# Patient Record
Sex: MALE | Race: WHITE | Employment: OTHER | URBAN - NONMETROPOLITAN AREA
[De-identification: names, ages, dates, MRNs, and addresses within clinical notes are randomized per-mention and may not be internally consistent; named-entity substitution may affect disease eponyms.]

---

## 2017-11-28 ENCOUNTER — HOSPITAL ENCOUNTER (OUTPATIENT)
Dept: NON INVASIVE DIAGNOSTICS | Age: 72
Discharge: HOME | End: 2017-11-28
Payer: OTHER GOVERNMENT

## 2017-11-28 LAB
LV EF: 60 %
LVEF MODALITY: NORMAL

## 2017-11-28 PROCEDURE — A9500 TC99M SESTAMIBI: HCPCS | Performed by: FAMILY MEDICINE

## 2017-11-28 PROCEDURE — 93017 CV STRESS TEST TRACING ONLY: CPT

## 2017-11-28 PROCEDURE — 3430000000 HC RX DIAGNOSTIC RADIOPHARMACEUTICAL: Performed by: INTERNAL MEDICINE

## 2017-11-28 PROCEDURE — A9500 TC99M SESTAMIBI: HCPCS | Performed by: INTERNAL MEDICINE

## 2017-11-28 PROCEDURE — 3430000000 HC RX DIAGNOSTIC RADIOPHARMACEUTICAL: Performed by: FAMILY MEDICINE

## 2017-11-28 PROCEDURE — 6360000002 HC RX W HCPCS: Performed by: FAMILY MEDICINE

## 2017-11-28 PROCEDURE — 93306 TTE W/DOPPLER COMPLETE: CPT

## 2017-11-28 PROCEDURE — 78452 HT MUSCLE IMAGE SPECT MULT: CPT

## 2017-11-28 RX ADMIN — Medication 33.1 MILLICURIE: at 09:35

## 2017-11-28 RX ADMIN — REGADENOSON 0.4 MG: 0.08 INJECTION, SOLUTION INTRAVENOUS at 09:35

## 2017-11-29 ENCOUNTER — HOSPITAL ENCOUNTER (OUTPATIENT)
Dept: NON INVASIVE DIAGNOSTICS | Age: 72
Discharge: HOME | End: 2017-11-29
Payer: OTHER GOVERNMENT

## 2017-11-29 PROCEDURE — A9500 TC99M SESTAMIBI: HCPCS | Performed by: INTERNAL MEDICINE

## 2017-11-29 PROCEDURE — 3430000000 HC RX DIAGNOSTIC RADIOPHARMACEUTICAL: Performed by: INTERNAL MEDICINE

## 2017-11-29 RX ADMIN — Medication 30 MILLICURIE: at 12:25

## 2018-06-11 ENCOUNTER — APPOINTMENT (OUTPATIENT)
Dept: GENERAL RADIOLOGY | Age: 73
End: 2018-06-11
Payer: OTHER GOVERNMENT

## 2018-06-11 ENCOUNTER — HOSPITAL ENCOUNTER (EMERGENCY)
Age: 73
Discharge: HOME | End: 2018-06-11
Attending: EMERGENCY MEDICINE
Payer: OTHER GOVERNMENT

## 2018-06-11 VITALS — TEMPERATURE: 97.9 F | HEART RATE: 55 BPM | RESPIRATION RATE: 16 BRPM | OXYGEN SATURATION: 95 %

## 2018-06-11 DIAGNOSIS — S52.501A CLOSED FRACTURE OF DISTAL ENDS OF RIGHT RADIUS AND ULNA, INITIAL ENCOUNTER: Primary | ICD-10-CM

## 2018-06-11 DIAGNOSIS — S52.601A CLOSED FRACTURE OF DISTAL ENDS OF RIGHT RADIUS AND ULNA, INITIAL ENCOUNTER: Primary | ICD-10-CM

## 2018-06-11 LAB
ABSOLUTE EOS #: 0.22 K/UL (ref 0–0.44)
ABSOLUTE IMMATURE GRANULOCYTE: 0.06 K/UL (ref 0–0.3)
ABSOLUTE LYMPH #: 0.9 K/UL (ref 1.1–3.7)
ABSOLUTE MONO #: 0.67 K/UL (ref 0.1–1.2)
ALBUMIN SERPL-MCNC: 3.7 G/DL (ref 3.5–5.2)
ALBUMIN/GLOBULIN RATIO: 1.2 (ref 1–2.5)
ALP BLD-CCNC: 85 U/L (ref 40–129)
ALT SERPL-CCNC: 13 U/L (ref 5–41)
ANION GAP SERPL CALCULATED.3IONS-SCNC: 13 MMOL/L (ref 9–17)
AST SERPL-CCNC: 15 U/L
BASOPHILS # BLD: 1 % (ref 0–2)
BASOPHILS ABSOLUTE: 0.06 K/UL (ref 0–0.2)
BILIRUB SERPL-MCNC: 0.35 MG/DL (ref 0.3–1.2)
BUN BLDV-MCNC: 11 MG/DL (ref 8–23)
BUN/CREAT BLD: 15 (ref 9–20)
CALCIUM SERPL-MCNC: 9.1 MG/DL (ref 8.6–10.4)
CHLORIDE BLD-SCNC: 101 MMOL/L (ref 98–107)
CO2: 26 MMOL/L (ref 20–31)
CREAT SERPL-MCNC: 0.74 MG/DL (ref 0.7–1.2)
DIFFERENTIAL TYPE: ABNORMAL
EKG ATRIAL RATE: 54 BPM
EKG P AXIS: 44 DEGREES
EKG P-R INTERVAL: 188 MS
EKG Q-T INTERVAL: 452 MS
EKG QRS DURATION: 108 MS
EKG QTC CALCULATION (BAZETT): 428 MS
EKG R AXIS: 7 DEGREES
EKG T AXIS: 19 DEGREES
EKG VENTRICULAR RATE: 54 BPM
EOSINOPHILS RELATIVE PERCENT: 3 % (ref 1–4)
GFR AFRICAN AMERICAN: >60 ML/MIN
GFR NON-AFRICAN AMERICAN: >60 ML/MIN
GFR SERPL CREATININE-BSD FRML MDRD: ABNORMAL ML/MIN/{1.73_M2}
GFR SERPL CREATININE-BSD FRML MDRD: ABNORMAL ML/MIN/{1.73_M2}
GLUCOSE BLD-MCNC: 117 MG/DL (ref 70–99)
HCT VFR BLD CALC: 39.7 % (ref 40.7–50.3)
HEMOGLOBIN: 13.6 G/DL (ref 13–17)
IMMATURE GRANULOCYTES: 1 %
LYMPHOCYTES # BLD: 12 % (ref 24–43)
MCH RBC QN AUTO: 31.1 PG (ref 25.2–33.5)
MCHC RBC AUTO-ENTMCNC: 34.3 G/DL (ref 28.4–34.8)
MCV RBC AUTO: 90.8 FL (ref 82.6–102.9)
MONOCYTES # BLD: 9 % (ref 3–12)
NRBC AUTOMATED: 0 PER 100 WBC
PDW BLD-RTO: 13.7 % (ref 11.8–14.4)
PLATELET # BLD: 206 K/UL (ref 138–453)
PLATELET ESTIMATE: ABNORMAL
PMV BLD AUTO: 10.2 FL (ref 8.1–13.5)
POTASSIUM SERPL-SCNC: 3.8 MMOL/L (ref 3.7–5.3)
RBC # BLD: 4.37 M/UL (ref 4.21–5.77)
RBC # BLD: ABNORMAL 10*6/UL
SEG NEUTROPHILS: 74 % (ref 36–65)
SEGMENTED NEUTROPHILS ABSOLUTE COUNT: 5.37 K/UL (ref 1.5–8.1)
SODIUM BLD-SCNC: 140 MMOL/L (ref 135–144)
TOTAL PROTEIN: 6.9 G/DL (ref 6.4–8.3)
WBC # BLD: 7.3 K/UL (ref 3.5–11.3)
WBC # BLD: ABNORMAL 10*3/UL

## 2018-06-11 PROCEDURE — 6370000000 HC RX 637 (ALT 250 FOR IP): Performed by: EMERGENCY MEDICINE

## 2018-06-11 PROCEDURE — 85025 COMPLETE CBC W/AUTO DIFF WBC: CPT

## 2018-06-11 PROCEDURE — 71046 X-RAY EXAM CHEST 2 VIEWS: CPT

## 2018-06-11 PROCEDURE — 36415 COLL VENOUS BLD VENIPUNCTURE: CPT

## 2018-06-11 PROCEDURE — 80053 COMPREHEN METABOLIC PANEL: CPT

## 2018-06-11 PROCEDURE — 93005 ELECTROCARDIOGRAM TRACING: CPT

## 2018-06-11 PROCEDURE — 6360000002 HC RX W HCPCS: Performed by: EMERGENCY MEDICINE

## 2018-06-11 PROCEDURE — 73110 X-RAY EXAM OF WRIST: CPT

## 2018-06-11 PROCEDURE — 99284 EMERGENCY DEPT VISIT MOD MDM: CPT

## 2018-06-11 RX ORDER — ATORVASTATIN CALCIUM 10 MG/1
10 TABLET, FILM COATED ORAL DAILY
COMMUNITY

## 2018-06-11 RX ORDER — OXYCODONE HYDROCHLORIDE AND ACETAMINOPHEN 5; 325 MG/1; MG/1
1 TABLET ORAL ONCE
Status: COMPLETED | OUTPATIENT
Start: 2018-06-11 | End: 2018-06-11

## 2018-06-11 RX ORDER — HYDROCODONE BITARTRATE AND ACETAMINOPHEN 5; 325 MG/1; MG/1
1 TABLET ORAL EVERY 6 HOURS PRN
Qty: 10 TABLET | Refills: 0 | Status: ON HOLD | OUTPATIENT
Start: 2018-06-11 | End: 2018-06-12

## 2018-06-11 RX ORDER — ONDANSETRON 4 MG/1
4 TABLET, ORALLY DISINTEGRATING ORAL ONCE
Status: COMPLETED | OUTPATIENT
Start: 2018-06-11 | End: 2018-06-11

## 2018-06-11 RX ORDER — IBUPROFEN 200 MG
200 TABLET ORAL EVERY 6 HOURS PRN
COMMUNITY

## 2018-06-11 RX ADMIN — OXYCODONE HYDROCHLORIDE AND ACETAMINOPHEN 1 TABLET: 5; 325 TABLET ORAL at 09:19

## 2018-06-11 RX ADMIN — ONDANSETRON 4 MG: 4 TABLET, ORALLY DISINTEGRATING ORAL at 09:19

## 2018-06-11 ASSESSMENT — ENCOUNTER SYMPTOMS
ABDOMINAL PAIN: 0
COUGH: 0
SINUS PRESSURE: 0
SINUS PAIN: 0
DIARRHEA: 0
EYE PAIN: 0
SORE THROAT: 0
SHORTNESS OF BREATH: 0
NAUSEA: 0
VOMITING: 0
EYE DISCHARGE: 0

## 2018-06-11 ASSESSMENT — PAIN DESCRIPTION - ORIENTATION: ORIENTATION: RIGHT

## 2018-06-11 ASSESSMENT — PAIN DESCRIPTION - FREQUENCY: FREQUENCY: CONTINUOUS

## 2018-06-11 ASSESSMENT — PAIN SCALES - GENERAL
PAINLEVEL_OUTOF10: 5
PAINLEVEL_OUTOF10: 9
PAINLEVEL_OUTOF10: 9

## 2018-06-11 ASSESSMENT — PAIN DESCRIPTION - PAIN TYPE: TYPE: ACUTE PAIN

## 2018-06-11 ASSESSMENT — PAIN DESCRIPTION - LOCATION: LOCATION: WRIST

## 2018-06-11 ASSESSMENT — PAIN DESCRIPTION - DESCRIPTORS: DESCRIPTORS: ACHING;DISCOMFORT;POUNDING

## 2018-06-12 ENCOUNTER — HOSPITAL ENCOUNTER (OUTPATIENT)
Age: 73
Setting detail: OUTPATIENT SURGERY
Discharge: HOME | End: 2018-06-12
Attending: ORTHOPAEDIC SURGERY | Admitting: ORTHOPAEDIC SURGERY
Payer: OTHER GOVERNMENT

## 2018-06-12 ENCOUNTER — ANESTHESIA (OUTPATIENT)
Dept: OPERATING ROOM | Age: 73
End: 2018-06-12
Payer: OTHER GOVERNMENT

## 2018-06-12 ENCOUNTER — APPOINTMENT (OUTPATIENT)
Dept: GENERAL RADIOLOGY | Age: 73
End: 2018-06-12
Attending: ORTHOPAEDIC SURGERY
Payer: OTHER GOVERNMENT

## 2018-06-12 ENCOUNTER — ANESTHESIA EVENT (OUTPATIENT)
Dept: OPERATING ROOM | Age: 73
End: 2018-06-12
Payer: OTHER GOVERNMENT

## 2018-06-12 VITALS
RESPIRATION RATE: 2 BRPM | OXYGEN SATURATION: 96 % | TEMPERATURE: 98.6 F | SYSTOLIC BLOOD PRESSURE: 126 MMHG | DIASTOLIC BLOOD PRESSURE: 69 MMHG

## 2018-06-12 VITALS
SYSTOLIC BLOOD PRESSURE: 137 MMHG | BODY MASS INDEX: 32.58 KG/M2 | TEMPERATURE: 97.4 F | HEART RATE: 60 BPM | HEIGHT: 69 IN | WEIGHT: 220 LBS | RESPIRATION RATE: 16 BRPM | DIASTOLIC BLOOD PRESSURE: 77 MMHG | OXYGEN SATURATION: 98 %

## 2018-06-12 DIAGNOSIS — S52.601A CLOSED FRACTURE OF DISTAL ENDS OF RIGHT RADIUS AND ULNA, INITIAL ENCOUNTER: ICD-10-CM

## 2018-06-12 DIAGNOSIS — G89.18 POSTOPERATIVE PAIN: Primary | ICD-10-CM

## 2018-06-12 DIAGNOSIS — S52.501A CLOSED FRACTURE OF DISTAL ENDS OF RIGHT RADIUS AND ULNA, INITIAL ENCOUNTER: ICD-10-CM

## 2018-06-12 PROCEDURE — 3600000004 HC SURGERY LEVEL 4 BASE: Performed by: ORTHOPAEDIC SURGERY

## 2018-06-12 PROCEDURE — 7100000011 HC PHASE II RECOVERY - ADDTL 15 MIN: Performed by: ORTHOPAEDIC SURGERY

## 2018-06-12 PROCEDURE — 6370000000 HC RX 637 (ALT 250 FOR IP): Performed by: ORTHOPAEDIC SURGERY

## 2018-06-12 PROCEDURE — 2580000003 HC RX 258: Performed by: ORTHOPAEDIC SURGERY

## 2018-06-12 PROCEDURE — 3700000000 HC ANESTHESIA ATTENDED CARE: Performed by: ORTHOPAEDIC SURGERY

## 2018-06-12 PROCEDURE — 6360000002 HC RX W HCPCS: Performed by: NURSE ANESTHETIST, CERTIFIED REGISTERED

## 2018-06-12 PROCEDURE — 7100000000 HC PACU RECOVERY - FIRST 15 MIN: Performed by: ORTHOPAEDIC SURGERY

## 2018-06-12 PROCEDURE — 6360000002 HC RX W HCPCS: Performed by: ORTHOPAEDIC SURGERY

## 2018-06-12 PROCEDURE — 3700000001 HC ADD 15 MINUTES (ANESTHESIA): Performed by: ORTHOPAEDIC SURGERY

## 2018-06-12 PROCEDURE — 7100000010 HC PHASE II RECOVERY - FIRST 15 MIN: Performed by: ORTHOPAEDIC SURGERY

## 2018-06-12 PROCEDURE — 2500000003 HC RX 250 WO HCPCS: Performed by: NURSE ANESTHETIST, CERTIFIED REGISTERED

## 2018-06-12 PROCEDURE — C1713 ANCHOR/SCREW BN/BN,TIS/BN: HCPCS | Performed by: ORTHOPAEDIC SURGERY

## 2018-06-12 PROCEDURE — 7100000001 HC PACU RECOVERY - ADDTL 15 MIN: Performed by: ORTHOPAEDIC SURGERY

## 2018-06-12 PROCEDURE — 2720000010 HC SURG SUPPLY STERILE: Performed by: ORTHOPAEDIC SURGERY

## 2018-06-12 PROCEDURE — 3600000014 HC SURGERY LEVEL 4 ADDTL 15MIN: Performed by: ORTHOPAEDIC SURGERY

## 2018-06-12 PROCEDURE — 73100 X-RAY EXAM OF WRIST: CPT

## 2018-06-12 DEVICE — PEG BONE FIX L22MM DIA2.2MM DSTL VOLAR RAD SMOOTH LCK FOR: Type: IMPLANTABLE DEVICE | Site: WRIST | Status: FUNCTIONAL

## 2018-06-12 DEVICE — SCREW BNE L18MM DIA2.7MM DST VOLAR RAD NONLOCKING FULL THRD: Type: IMPLANTABLE DEVICE | Site: WRIST | Status: FUNCTIONAL

## 2018-06-12 DEVICE — K WIRE FIX DIA1.6MM S STL FOR DST VOLAR PLATING SYS: Type: IMPLANTABLE DEVICE | Site: WRIST | Status: FUNCTIONAL

## 2018-06-12 DEVICE — SCREW BNE L20MM DIA2.7MM CORT DST RAD NONLOCKING FULL THRD: Type: IMPLANTABLE DEVICE | Site: WRIST | Status: FUNCTIONAL

## 2018-06-12 DEVICE — PEG BONE FIX L24MM DIA2.2MM DSTL VOLAR RAD SMOOTH LCK FOR: Type: IMPLANTABLE DEVICE | Site: WRIST | Status: FUNCTIONAL

## 2018-06-12 RX ORDER — HYDROCODONE BITARTRATE AND ACETAMINOPHEN 5; 325 MG/1; MG/1
1 TABLET ORAL EVERY 4 HOURS PRN
Qty: 40 TABLET | Refills: 0 | Status: SHIPPED | OUTPATIENT
Start: 2018-06-12 | End: 2018-06-19

## 2018-06-12 RX ORDER — ONDANSETRON 2 MG/ML
INJECTION INTRAMUSCULAR; INTRAVENOUS PRN
Status: DISCONTINUED | OUTPATIENT
Start: 2018-06-12 | End: 2018-06-12 | Stop reason: SDUPTHER

## 2018-06-12 RX ORDER — MEPERIDINE HYDROCHLORIDE 50 MG/ML
12.5 INJECTION INTRAMUSCULAR; INTRAVENOUS; SUBCUTANEOUS EVERY 5 MIN PRN
Status: DISCONTINUED | OUTPATIENT
Start: 2018-06-12 | End: 2018-06-12 | Stop reason: HOSPADM

## 2018-06-12 RX ORDER — DEXAMETHASONE SODIUM PHOSPHATE 4 MG/ML
INJECTION, SOLUTION INTRA-ARTICULAR; INTRALESIONAL; INTRAMUSCULAR; INTRAVENOUS; SOFT TISSUE PRN
Status: DISCONTINUED | OUTPATIENT
Start: 2018-06-12 | End: 2018-06-12 | Stop reason: SDUPTHER

## 2018-06-12 RX ORDER — PROPOFOL 10 MG/ML
INJECTION, EMULSION INTRAVENOUS CONTINUOUS PRN
Status: DISCONTINUED | OUTPATIENT
Start: 2018-06-12 | End: 2018-06-12 | Stop reason: SDUPTHER

## 2018-06-12 RX ORDER — ACETAMINOPHEN 500 MG
1500 TABLET ORAL EVERY 6 HOURS PRN
COMMUNITY

## 2018-06-12 RX ORDER — PROPOFOL 10 MG/ML
INJECTION, EMULSION INTRAVENOUS PRN
Status: DISCONTINUED | OUTPATIENT
Start: 2018-06-12 | End: 2018-06-12 | Stop reason: SDUPTHER

## 2018-06-12 RX ORDER — DIMENHYDRINATE 50 MG/1
50 TABLET ORAL ONCE
Status: COMPLETED | OUTPATIENT
Start: 2018-06-12 | End: 2018-06-12

## 2018-06-12 RX ORDER — FENTANYL CITRATE 50 UG/ML
25 INJECTION, SOLUTION INTRAMUSCULAR; INTRAVENOUS EVERY 5 MIN PRN
Status: DISCONTINUED | OUTPATIENT
Start: 2018-06-12 | End: 2018-06-12 | Stop reason: HOSPADM

## 2018-06-12 RX ORDER — LABETALOL HYDROCHLORIDE 5 MG/ML
5 INJECTION, SOLUTION INTRAVENOUS EVERY 10 MIN PRN
Status: DISCONTINUED | OUTPATIENT
Start: 2018-06-12 | End: 2018-06-12 | Stop reason: HOSPADM

## 2018-06-12 RX ORDER — OXYCODONE HYDROCHLORIDE 5 MG/1
5 TABLET ORAL
Status: COMPLETED | OUTPATIENT
Start: 2018-06-12 | End: 2018-06-12

## 2018-06-12 RX ORDER — ACETAMINOPHEN 325 MG/1
650 TABLET ORAL EVERY 4 HOURS PRN
Status: DISCONTINUED | OUTPATIENT
Start: 2018-06-12 | End: 2018-06-12 | Stop reason: HOSPADM

## 2018-06-12 RX ORDER — LIDOCAINE HYDROCHLORIDE 20 MG/ML
INJECTION, SOLUTION INFILTRATION; PERINEURAL PRN
Status: DISCONTINUED | OUTPATIENT
Start: 2018-06-12 | End: 2018-06-12 | Stop reason: SDUPTHER

## 2018-06-12 RX ORDER — FENTANYL CITRATE 50 UG/ML
50 INJECTION, SOLUTION INTRAMUSCULAR; INTRAVENOUS EVERY 5 MIN PRN
Status: DISCONTINUED | OUTPATIENT
Start: 2018-06-12 | End: 2018-06-12 | Stop reason: HOSPADM

## 2018-06-12 RX ORDER — FENTANYL CITRATE 50 UG/ML
INJECTION, SOLUTION INTRAMUSCULAR; INTRAVENOUS PRN
Status: DISCONTINUED | OUTPATIENT
Start: 2018-06-12 | End: 2018-06-12 | Stop reason: SDUPTHER

## 2018-06-12 RX ORDER — PROMETHAZINE HYDROCHLORIDE 25 MG/ML
6.25 INJECTION, SOLUTION INTRAMUSCULAR; INTRAVENOUS
Status: DISCONTINUED | OUTPATIENT
Start: 2018-06-12 | End: 2018-06-12 | Stop reason: HOSPADM

## 2018-06-12 RX ORDER — DIMENHYDRINATE 50 MG/1
50 TABLET ORAL NIGHTLY PRN
Status: DISCONTINUED | OUTPATIENT
Start: 2018-06-12 | End: 2018-06-12

## 2018-06-12 RX ORDER — EPHEDRINE SULFATE 50 MG/ML
INJECTION, SOLUTION INTRAVENOUS PRN
Status: DISCONTINUED | OUTPATIENT
Start: 2018-06-12 | End: 2018-06-12 | Stop reason: SDUPTHER

## 2018-06-12 RX ORDER — SODIUM CHLORIDE, SODIUM LACTATE, POTASSIUM CHLORIDE, CALCIUM CHLORIDE 600; 310; 30; 20 MG/100ML; MG/100ML; MG/100ML; MG/100ML
INJECTION, SOLUTION INTRAVENOUS CONTINUOUS
Status: DISCONTINUED | OUTPATIENT
Start: 2018-06-12 | End: 2018-06-12 | Stop reason: HOSPADM

## 2018-06-12 RX ORDER — ONDANSETRON 2 MG/ML
4 INJECTION INTRAMUSCULAR; INTRAVENOUS
Status: DISCONTINUED | OUTPATIENT
Start: 2018-06-12 | End: 2018-06-12 | Stop reason: HOSPADM

## 2018-06-12 RX ADMIN — FENTANYL CITRATE 50 MCG: 50 INJECTION INTRAMUSCULAR; INTRAVENOUS at 14:52

## 2018-06-12 RX ADMIN — FENTANYL CITRATE 50 MCG: 50 INJECTION INTRAMUSCULAR; INTRAVENOUS at 14:47

## 2018-06-12 RX ADMIN — OXYCODONE HYDROCHLORIDE 5 MG: 5 TABLET ORAL at 17:17

## 2018-06-12 RX ADMIN — EPHEDRINE SULFATE 15 MG: 50 INJECTION INTRAMUSCULAR; INTRAVENOUS; SUBCUTANEOUS at 15:56

## 2018-06-12 RX ADMIN — PROPOFOL 200 MG: 10 INJECTION, EMULSION INTRAVENOUS at 14:52

## 2018-06-12 RX ADMIN — LIDOCAINE HYDROCHLORIDE 100 MG: 20 INJECTION, SOLUTION INFILTRATION; PERINEURAL at 14:52

## 2018-06-12 RX ADMIN — FENTANYL CITRATE 25 MCG: 50 INJECTION INTRAMUSCULAR; INTRAVENOUS at 16:48

## 2018-06-12 RX ADMIN — DEXAMETHASONE SODIUM PHOSPHATE 8 MG: 4 INJECTION, SOLUTION INTRAMUSCULAR; INTRAVENOUS at 14:56

## 2018-06-12 RX ADMIN — SODIUM CHLORIDE, POTASSIUM CHLORIDE, SODIUM LACTATE AND CALCIUM CHLORIDE: 600; 310; 30; 20 INJECTION, SOLUTION INTRAVENOUS at 14:24

## 2018-06-12 RX ADMIN — ONDANSETRON 4 MG: 2 INJECTION INTRAMUSCULAR; INTRAVENOUS at 15:20

## 2018-06-12 RX ADMIN — SODIUM CHLORIDE, POTASSIUM CHLORIDE, SODIUM LACTATE AND CALCIUM CHLORIDE: 600; 310; 30; 20 INJECTION, SOLUTION INTRAVENOUS at 15:56

## 2018-06-12 RX ADMIN — PROPOFOL 75 MCG/KG/MIN: 10 INJECTION, EMULSION INTRAVENOUS at 14:52

## 2018-06-12 RX ADMIN — DIMENHYDRINATE 50 MG: 50 TABLET ORAL at 14:31

## 2018-06-12 RX ADMIN — ONDANSETRON 4 MG: 2 INJECTION INTRAMUSCULAR; INTRAVENOUS at 16:00

## 2018-06-12 RX ADMIN — FENTANYL CITRATE 25 MCG: 50 INJECTION INTRAMUSCULAR; INTRAVENOUS at 16:38

## 2018-06-12 RX ADMIN — Medication 2 G: at 14:43

## 2018-06-12 ASSESSMENT — PAIN DESCRIPTION - PAIN TYPE
TYPE: ACUTE PAIN;SURGICAL PAIN

## 2018-06-12 ASSESSMENT — PAIN DESCRIPTION - LOCATION
LOCATION: WRIST

## 2018-06-12 ASSESSMENT — PAIN SCALES - GENERAL
PAINLEVEL_OUTOF10: 6
PAINLEVEL_OUTOF10: 6
PAINLEVEL_OUTOF10: 8
PAINLEVEL_OUTOF10: 8
PAINLEVEL_OUTOF10: 7
PAINLEVEL_OUTOF10: 8
PAINLEVEL_OUTOF10: 7
PAINLEVEL_OUTOF10: 7
PAINLEVEL_OUTOF10: 8
PAINLEVEL_OUTOF10: 7
PAINLEVEL_OUTOF10: 8

## 2018-06-12 ASSESSMENT — PULMONARY FUNCTION TESTS
PIF_VALUE: 0
PIF_VALUE: 1
PIF_VALUE: 12
PIF_VALUE: 16
PIF_VALUE: 4
PIF_VALUE: 14
PIF_VALUE: 14
PIF_VALUE: 18
PIF_VALUE: 2
PIF_VALUE: 3
PIF_VALUE: 12
PIF_VALUE: 16
PIF_VALUE: 12
PIF_VALUE: 18
PIF_VALUE: 12
PIF_VALUE: 2
PIF_VALUE: 18
PIF_VALUE: 0
PIF_VALUE: 18
PIF_VALUE: 1
PIF_VALUE: 16
PIF_VALUE: 17
PIF_VALUE: 18
PIF_VALUE: 12
PIF_VALUE: 2
PIF_VALUE: 3
PIF_VALUE: 2
PIF_VALUE: 17
PIF_VALUE: 17
PIF_VALUE: 12
PIF_VALUE: 18
PIF_VALUE: 14
PIF_VALUE: 11
PIF_VALUE: 3
PIF_VALUE: 0
PIF_VALUE: 18
PIF_VALUE: 0
PIF_VALUE: 18
PIF_VALUE: 12
PIF_VALUE: 15
PIF_VALUE: 17
PIF_VALUE: 18
PIF_VALUE: 2
PIF_VALUE: 12
PIF_VALUE: 18
PIF_VALUE: 18
PIF_VALUE: 14
PIF_VALUE: 0
PIF_VALUE: 18
PIF_VALUE: 16
PIF_VALUE: 0
PIF_VALUE: 14
PIF_VALUE: 1
PIF_VALUE: 18
PIF_VALUE: 18
PIF_VALUE: 12
PIF_VALUE: 18
PIF_VALUE: 12
PIF_VALUE: 0
PIF_VALUE: 13
PIF_VALUE: 14
PIF_VALUE: 16
PIF_VALUE: 18
PIF_VALUE: 17
PIF_VALUE: 4
PIF_VALUE: 12
PIF_VALUE: 18
PIF_VALUE: 12
PIF_VALUE: 18
PIF_VALUE: 18
PIF_VALUE: 1
PIF_VALUE: 10
PIF_VALUE: 14
PIF_VALUE: 2
PIF_VALUE: 10
PIF_VALUE: 18
PIF_VALUE: 12
PIF_VALUE: 14
PIF_VALUE: 18
PIF_VALUE: 16
PIF_VALUE: 12
PIF_VALUE: 2
PIF_VALUE: 0
PIF_VALUE: 18
PIF_VALUE: 4
PIF_VALUE: 18
PIF_VALUE: 12
PIF_VALUE: 16
PIF_VALUE: 18
PIF_VALUE: 17
PIF_VALUE: 2
PIF_VALUE: 27
PIF_VALUE: 1

## 2018-06-12 ASSESSMENT — PAIN DESCRIPTION - ORIENTATION
ORIENTATION: RIGHT

## 2018-06-12 ASSESSMENT — PAIN - FUNCTIONAL ASSESSMENT: PAIN_FUNCTIONAL_ASSESSMENT: 0-10

## 2020-09-08 ENCOUNTER — HOSPITAL ENCOUNTER (OUTPATIENT)
Dept: GENERAL RADIOLOGY | Age: 75
Discharge: HOME | End: 2020-09-10
Payer: OTHER GOVERNMENT

## 2020-09-08 PROCEDURE — 73630 X-RAY EXAM OF FOOT: CPT

## 2021-12-09 ENCOUNTER — TELEPHONE (OUTPATIENT)
Dept: SURGERY | Age: 76
End: 2021-12-09

## 2021-12-09 NOTE — TELEPHONE ENCOUNTER
Elton Eber was scheduled for a direct colonoscopy with Dr. June Jalloh. He stated that the [place] sent him a prep to use (Movi prep) and he was informed that it was ok to use that prep. All questions were answered and any other instructions were mailed to him.

## 2022-02-18 DIAGNOSIS — Z01.818 PRE-OP TESTING: Primary | ICD-10-CM

## 2022-02-18 RX ORDER — DICLOFENAC POTASSIUM 50 MG/1
50 TABLET, FILM COATED ORAL DAILY
COMMUNITY

## 2022-02-18 NOTE — PROGRESS NOTES
Patient states they received their colon prep instructions and home medications that are to be taken on the day of their procedure with a small sip of water only, from the physician's office. Caitlyn Campbell at Dr. Russell Chairez office notified pt will need an EKG done pre-op.

## 2022-02-24 ENCOUNTER — HOSPITAL ENCOUNTER (OUTPATIENT)
Dept: PREADMISSION TESTING | Age: 77
Setting detail: SPECIMEN
Discharge: HOME | End: 2022-02-28
Payer: OTHER GOVERNMENT

## 2022-02-24 ENCOUNTER — HOSPITAL ENCOUNTER (OUTPATIENT)
Age: 77
Discharge: HOME | End: 2022-02-24

## 2022-02-24 DIAGNOSIS — Z01.818 PRE-OP TESTING: ICD-10-CM

## 2022-02-24 DIAGNOSIS — Z01.818 PREOP TESTING: Primary | ICD-10-CM

## 2022-02-24 LAB
EKG ATRIAL RATE: 64 BPM
EKG P AXIS: 46 DEGREES
EKG P-R INTERVAL: 182 MS
EKG Q-T INTERVAL: 440 MS
EKG QRS DURATION: 122 MS
EKG QTC CALCULATION (BAZETT): 453 MS
EKG R AXIS: 19 DEGREES
EKG T AXIS: 23 DEGREES
EKG VENTRICULAR RATE: 64 BPM

## 2022-02-24 PROCEDURE — C9803 HOPD COVID-19 SPEC COLLECT: HCPCS

## 2022-02-24 PROCEDURE — U0005 INFEC AGEN DETEC AMPLI PROBE: HCPCS

## 2022-02-24 PROCEDURE — U0003 INFECTIOUS AGENT DETECTION BY NUCLEIC ACID (DNA OR RNA); SEVERE ACUTE RESPIRATORY SYNDROME CORONAVIRUS 2 (SARS-COV-2) (CORONAVIRUS DISEASE [COVID-19]), AMPLIFIED PROBE TECHNIQUE, MAKING USE OF HIGH THROUGHPUT TECHNOLOGIES AS DESCRIBED BY CMS-2020-01-R: HCPCS

## 2022-02-25 LAB
SARS-COV-2: NORMAL
SARS-COV-2: NOT DETECTED
SOURCE: NORMAL

## 2022-03-02 ENCOUNTER — ANESTHESIA EVENT (OUTPATIENT)
Dept: OPERATING ROOM | Age: 77
End: 2022-03-02
Payer: OTHER GOVERNMENT

## 2022-03-03 ENCOUNTER — HOSPITAL ENCOUNTER (OUTPATIENT)
Age: 77
Setting detail: OUTPATIENT SURGERY
Discharge: HOME | End: 2022-03-03
Attending: SURGERY | Admitting: SURGERY
Payer: OTHER GOVERNMENT

## 2022-03-03 ENCOUNTER — ANESTHESIA (OUTPATIENT)
Dept: OPERATING ROOM | Age: 77
End: 2022-03-03
Payer: OTHER GOVERNMENT

## 2022-03-03 VITALS
BODY MASS INDEX: 27.06 KG/M2 | WEIGHT: 189 LBS | SYSTOLIC BLOOD PRESSURE: 167 MMHG | HEART RATE: 57 BPM | RESPIRATION RATE: 16 BRPM | OXYGEN SATURATION: 99 % | HEIGHT: 70 IN | TEMPERATURE: 98.8 F | DIASTOLIC BLOOD PRESSURE: 86 MMHG

## 2022-03-03 VITALS
OXYGEN SATURATION: 98 % | DIASTOLIC BLOOD PRESSURE: 62 MMHG | TEMPERATURE: 97.7 F | SYSTOLIC BLOOD PRESSURE: 117 MMHG | RESPIRATION RATE: 18 BRPM

## 2022-03-03 PROBLEM — Z12.11 SCREEN FOR COLON CANCER: Status: ACTIVE | Noted: 2022-03-03

## 2022-03-03 PROCEDURE — 2500000003 HC RX 250 WO HCPCS: Performed by: NURSE ANESTHETIST, CERTIFIED REGISTERED

## 2022-03-03 PROCEDURE — 3700000001 HC ADD 15 MINUTES (ANESTHESIA): Performed by: SURGERY

## 2022-03-03 PROCEDURE — 3609010300 HC COLONOSCOPY W/BIOPSY SINGLE/MULTIPLE: Performed by: SURGERY

## 2022-03-03 PROCEDURE — 2709999900 HC NON-CHARGEABLE SUPPLY: Performed by: SURGERY

## 2022-03-03 PROCEDURE — 45380 COLONOSCOPY AND BIOPSY: CPT | Performed by: SURGERY

## 2022-03-03 PROCEDURE — 2580000003 HC RX 258: Performed by: NURSE ANESTHETIST, CERTIFIED REGISTERED

## 2022-03-03 PROCEDURE — 88305 TISSUE EXAM BY PATHOLOGIST: CPT

## 2022-03-03 PROCEDURE — 7100000010 HC PHASE II RECOVERY - FIRST 15 MIN: Performed by: SURGERY

## 2022-03-03 PROCEDURE — 7100000011 HC PHASE II RECOVERY - ADDTL 15 MIN: Performed by: SURGERY

## 2022-03-03 PROCEDURE — 6360000002 HC RX W HCPCS: Performed by: NURSE ANESTHETIST, CERTIFIED REGISTERED

## 2022-03-03 PROCEDURE — 3700000000 HC ANESTHESIA ATTENDED CARE: Performed by: SURGERY

## 2022-03-03 PROCEDURE — 45381 COLONOSCOPY SUBMUCOUS NJX: CPT | Performed by: SURGERY

## 2022-03-03 RX ORDER — SODIUM CHLORIDE, SODIUM LACTATE, POTASSIUM CHLORIDE, CALCIUM CHLORIDE 600; 310; 30; 20 MG/100ML; MG/100ML; MG/100ML; MG/100ML
INJECTION, SOLUTION INTRAVENOUS CONTINUOUS
Status: DISCONTINUED | OUTPATIENT
Start: 2022-03-03 | End: 2022-03-03 | Stop reason: HOSPADM

## 2022-03-03 RX ORDER — SODIUM CHLORIDE 0.9 % (FLUSH) 0.9 %
5-40 SYRINGE (ML) INJECTION PRN
Status: DISCONTINUED | OUTPATIENT
Start: 2022-03-03 | End: 2022-03-03 | Stop reason: HOSPADM

## 2022-03-03 RX ORDER — PROPOFOL 10 MG/ML
INJECTION, EMULSION INTRAVENOUS PRN
Status: DISCONTINUED | OUTPATIENT
Start: 2022-03-03 | End: 2022-03-03

## 2022-03-03 RX ORDER — PROPOFOL 10 MG/ML
INJECTION, EMULSION INTRAVENOUS CONTINUOUS PRN
Status: DISCONTINUED | OUTPATIENT
Start: 2022-03-03 | End: 2022-03-03 | Stop reason: SDUPTHER

## 2022-03-03 RX ORDER — SODIUM CHLORIDE 0.9 % (FLUSH) 0.9 %
5-40 SYRINGE (ML) INJECTION EVERY 12 HOURS SCHEDULED
Status: DISCONTINUED | OUTPATIENT
Start: 2022-03-03 | End: 2022-03-03 | Stop reason: HOSPADM

## 2022-03-03 RX ORDER — SODIUM CHLORIDE 9 MG/ML
25 INJECTION, SOLUTION INTRAVENOUS PRN
Status: DISCONTINUED | OUTPATIENT
Start: 2022-03-03 | End: 2022-03-03 | Stop reason: HOSPADM

## 2022-03-03 RX ADMIN — PROPOFOL 150 MCG/KG/MIN: 10 INJECTION, EMULSION INTRAVENOUS at 14:40

## 2022-03-03 RX ADMIN — SODIUM CHLORIDE, POTASSIUM CHLORIDE, SODIUM LACTATE AND CALCIUM CHLORIDE: 600; 310; 30; 20 INJECTION, SOLUTION INTRAVENOUS at 13:22

## 2022-03-03 RX ADMIN — LIDOCAINE HYDROCHLORIDE 4 ML: 20 INJECTION, SOLUTION EPIDURAL; INFILTRATION; INTRACAUDAL at 14:40

## 2022-03-03 ASSESSMENT — PAIN - FUNCTIONAL ASSESSMENT: PAIN_FUNCTIONAL_ASSESSMENT: 0-10

## 2022-03-03 NOTE — PROGRESS NOTES
Discharge Criteria    Inpatients must meet Criteria 1 through 7. All other patients are either YES or N/A. If a NO is chosen then Anesthesia or Surgeon must be notified. 1.  Minimum 30 minutes after last dose of sedative medication, minimum 120 minutes after last dose of reversal agent. Yes      2. Systolic BP stable within 20 mmHg for 30 minutes & systolic BP between 90 & 536 or within 10 mmHg of baseline. Yes, anesthesia aware of B/P and ok'd for discharge      3. Pulse between 60 and 100 or within 10 bpm of baseline. Yes, anesthesia aware of low heart rate and ok'd for discharge      4. Spontaneous respiratory rate >/= 10 per minute. Yes      5. SaO2 >/= 95 or  >/= baseline. Yes      6. Able to cough and swallow or return to baseline function. Yes      7. Alert and oriented or return to baseline mental status. Yes      8. Demonstrates controlled, coordinated movements, ambulates with steady gait, or return to baseline activity function. Yes      9. Minimal or no pain or nausea, or at a level tolerable and acceptable to patient. Yes      10. Takes and retains oral fluids as allowed. Yes      11. Procedural / perioperative site stable. Minimal or no bleeding. N/A          12. If GI endoscopy procedure, minimal or no abdominal distention or passing flatus. Yes      13. Written discharge instructions and emergency telephone number provided. Yes, Discharge instructions given to patient and wife with understanding voiced. No questions asked at this time      15. Accompanied by a responsible adult.     Yes

## 2022-03-03 NOTE — ANESTHESIA PRE PROCEDURE
Department of Anesthesiology  Preprocedure Note       Name:  Raymond Freeman   Age:  78 y.o.  :  1945                                          MRN:  [id number]         Date:  3/3/2022      Surgeon: Checo Rust):  Sharda Ocampo DO    Procedure: Procedure(s):  COLORECTAL CANCER SCREENING, NOT HIGH RISK    Medications prior to admission:   Prior to Admission medications    Medication Sig Start Date End Date Taking? Authorizing Provider   diclofenac (CATAFLAM) 50 MG tablet Take 50 mg by mouth daily   Yes Historical Provider, MD   atorvastatin (LIPITOR) 10 MG tablet Take 10 mg by mouth daily 1/2 tablet at HS   Yes Historical Provider, MD   latanoprost (XALATAN) 0.005 % ophthalmic solution 1 drop nightly. Yes Historical Provider, MD   omeprazole (PRILOSEC) 20 MG capsule Take 20 mg by mouth every other day. Yes Historical Provider, MD   valsartan-hydrochlorothiazide (DIOVAN-HCT) 80-12.5 MG per tablet Take 1 tablet by mouth daily. Yes Historical Provider, MD   calcium carbonate 600 MG TABS tablet Take 1 tablet by mouth daily. Yes Historical Provider, MD   vitamin D 1000 UNITS CAPS Take 3,000 Int'l Units by mouth 3 times daily    Yes Historical Provider, MD   brimonidine (ALPHAGAN) 0.2 % ophthalmic solution Place 1 drop into both eyes 2 times daily. Yes Historical Provider, MD   acetaminophen (TYLENOL) 500 MG tablet Take 1,500 mg by mouth every 6 hours as needed for Pain    Historical Provider, MD   goserelin (ZOLADEX) 10.8 MG injection Inject 10.8 mg into the skin once    Historical Provider, MD   ibuprofen (ADVIL;MOTRIN) 200 MG tablet Take 200 mg by mouth every 6 hours as needed for Pain    Historical Provider, MD   aspirin 81 MG tablet Take 81 mg by mouth daily. Historical Provider, MD       Current medications:    Current Facility-Administered Medications   Medication Dose Route Frequency Provider Last Rate Last Admin    lactated ringers infusion   IntraVENous Continuous Kimberly Carvalho, [address] 100 mL/hr at 22 1322 New Bag at 22 1322       Allergies: Allergies   Allergen Reactions    Latex Rash       Problem List:    Patient Active Problem List   Diagnosis Code    S/P TKR (total knee replacement) Z96.659    Prostate cancer metastatic to multiple sites ([facility] C61    HTN (hypertension) I10    Vasodepressor syncope R55    Constipation K59.00       Past Medical History:        Diagnosis Date    Arthritis     Cancer ([facility] [address]     [facility]    Hyperlipidemia     Hypertension        Past Surgical History:        Procedure Laterality Date    COLONOSCOPY  2008    JOINT REPLACEMENT Right     ankle    JOINT REPLACEMENT Left     TKA    NM OPEN RX DISTAL RADIUS FX, EXTRA-ARTICULAR Right 2018    WRIST OPEN REDUCTION INTERNAL FIXATION performed by Steph Segura MD at [place] [address] Bilateral     SHOULDER SURGERY Right     [facility]       Social History:    Social History     Tobacco Use    Smoking status: Former Smoker     Years: 5.00     Quit date:      Years since quittin.1    Smokeless tobacco: Never Used   Substance Use Topics    Alcohol use:  No                                Counseling given: Not Answered      Vital Signs (Current):   Vitals:    22 1350 22 1311   BP:  (!) 160/89   Pulse:  73   Resp:  16   Temp:  36.1 °C (96.9 °F)   TempSrc:  Temporal   SpO2:  99%   Weight: 194 lb (88 kg) 189 lb (85.7 kg)   Height: 5' 10\" (1.778 m) 5' 10\" (1.778 m)                                              BP Readings from Last 3 Encounters:   22 (!) 160/89   18 137/77   18 126/69       NPO Status: Time of last liquid consumption: 830                        Time of last solid consumption: 163                        Date of last liquid consumption: 22                        Date of last solid food consumption: 22    BMI:   Wt Readings from Last 3 Encounters:   22 189 lb (85.7 kg)   06/12/18 220 lb (99.8 kg)     Body mass index is 27.12 kg/m². CBC:   Lab Results   Component Value Date    WBC 7.3 06/11/2018    RBC 4.37 06/11/2018    HGB 13.6 06/11/2018    HCT 39.7 06/11/2018    MCV 90.8 06/11/2018    RDW 13.7 06/11/2018     06/11/2018       CMP:   Lab Results   Component Value Date     06/11/2018    K 3.8 06/11/2018     06/11/2018    CO2 26 06/11/2018    BUN 11 06/11/2018    CREATININE 0.74 06/11/2018    GFRAA >60 06/11/2018    LABGLOM >60 06/11/2018    GLUCOSE 117 06/11/2018    PROT 6.9 06/11/2018    CALCIUM 9.1 06/11/2018    BILITOT 0.35 06/11/2018    ALKPHOS 85 06/11/2018    AST 15 06/11/2018    ALT 13 06/11/2018       POC Tests: No results for input(s): POCGLU, POCNA, POCK, POCCL, POCBUN, POCHEMO, POCHCT in the last 72 hours. Coags: No results found for: PROTIME, INR, APTT    HCG (If Applicable): No results found for: PREGTESTUR, PREGSERUM, HCG, HCGQUANT     ABGs: No results found for: PHART, PO2ART, PPF4XYS, OGM0NEL, BEART, X7VRBAUJ     Type & Screen (If Applicable):  No results found for: LABABO, LABRH    Drug/Infectious Status (If Applicable):  No results found for: HIV, HEPCAB    COVID-19 Screening (If Applicable):   Lab Results   Component Value Date    COVID19 Not Detected 02/24/2022           Anesthesia Evaluation  Patient summary reviewed and Nursing notes reviewed no history of anesthetic complications:   Airway: Mallampati: I  TM distance: >3 FB   Neck ROM: full  Mouth opening: > = 3 FB Dental:      Comment: permanent bridges and crowns at molars    Pulmonary:Negative Pulmonary ROS breath sounds clear to auscultation                             Cardiovascular:    (+) hypertension:, hyperlipidemia        Rhythm: regular  Rate: normal                    Neuro/Psych:   Negative Neuro/Psych ROS              GI/Hepatic/Renal:   (+) bowel prep,           Endo/Other:    (+) : arthritis: OA., malignancy/cancer.                   ROS comment: prostate CA Abdominal:             Vascular: negative vascular ROS. Other Findings:             Anesthesia Plan      general     ASA 3             Anesthetic plan and risks discussed with patient.                     SWETA Barajas - CRNA   3/3/2022

## 2022-03-03 NOTE — ANESTHESIA POSTPROCEDURE EVALUATION
Department of Anesthesiology  Postprocedure Note    Patient: Katherin Rand  MRN: [id number]  [place]: 1945  Date of evaluation: 3/3/2022  Time:  4:22 PM     Procedure Summary     Date: 03/03/22 Room / Location: [address] [place] [address]    Anesthesia Start: 1303 Anesthesia Stop: 1485    Procedure: COLONOSCOPY WITH BIOPSY  with spot ink (N/A ) Diagnosis: (SCREENING)    Surgeons: Elvin Pollock DO Responsible Provider: SWETA Bravo CRNA    Anesthesia Type: general ASA Status: 3          Anesthesia Type: general    Marcial Phase I:      Marcial Phase II: Marcial Score: 10    Last vitals: Reviewed and per EMR flowsheets.        Anesthesia Post Evaluation    Patient location during evaluation: bedside  Patient participation: complete - patient participated  Level of consciousness: awake and alert  Pain score: 0  Airway patency: patent  Nausea & Vomiting: no nausea and no vomiting  Complications: no  Cardiovascular status: blood pressure returned to baseline  Respiratory status: acceptable  Hydration status: stable

## 2022-03-03 NOTE — OP NOTE
Operative Note      Patient: Reese Carrizales  YOB: 1945  MRN: David Martin MD      Date of Procedure: 3/3/2022    Pre-Op Diagnosis: screening colonoscopy. History prostate cancer. Post-Op Diagnosis: Same. Sigmoid diverticulosis. Cecal flat polyp. Procedure:    Colonoscopy to cecum with cold forceps polypectomy (cecal polyp)      Surgeon(s):  Marlee Nava DO    Anesthesia: Monitor Anesthesia Care    Estimated Blood Loss (mL): n/a    Complications: None    Specimens:   ID Type Source Tests Collected by Time Destination   A :  Tissue Cecum SURGICAL PATHOLOGY Marlee Nava DO 3/3/2022 1501      Procedure details:    I do meet with the patient in preoperative holding area. Please see H&P for indications for the above named procedure. Patient was brought to the endoscopy suite and placed under monitored anesthesia. Patient was positioned in left lateral position. Time out called and procedure confirmed. The lubricated variable stiffness pediatric colonoscope was carefully passed under direct vision into the rectum, advanced through the sigmoid colon, transverse colon, ascending colon and the cecum. The ileocecal valve was well visualized. Additional findings:    Findings:     Cecum:  IC valve and appendiceal orifice well confirmed. There is a flat, soft, lobulated polyp noted on a very thin fold of the cecal pouch opposite the IC valve. It is too thin to hot snare it off. Therefore, it is removed with multiple passes cold biopsies to perform polypectomy and specimen submitted piecemeal. Site hemostatic. I do ink tattoo the polypectomy site for future reference. Ascending: normal  Transverse: normal  Descending: normal  Sigmoid: mild diverticulosis without inflammation  Rectum: normal  Rectal exam: normal  Bowel prep quality: excellent    At the distal 1/3 of the rectum, the scope was retroflexed and was negative. The patient tolerated the procedure well.   The abdomen was soft after the procedure. I spoke with pt/family afterwards. Next colonoscopy 1-3 years pending final pathology report, will notify patient of results.      Results also sent to:    Pablo Chacon MD  Heme/Onc  [facility]  Dpt of Internal Medicine [address] [facility]  [address]  [facility] [employer], [address]  Tel: [phone]  Will ask office to get fax number to send    Electronically signed by Melissa Fairbanks DO, FACOS, FACS on 3/6/2022 at 11:06 AM

## 2022-03-03 NOTE — BRIEF OP NOTE
Brief Postoperative Note      Patient: Raymond Freeman  YOB: 1945  MRN: Yemi Xiong MD      Date of Procedure: 3/3/2022    Pre-Op Diagnosis: screening colonoscopy. History prostate cancer. Post-Op Diagnosis: Same. Sigmoid diverticulosis. Cecal flat polyp.     Procedure:    Colonoscopy to cecum with cold forceps polypectomy (cecal polyp)      Surgeon(s):  Sharda Ocampo DO    Anesthesia: Monitor Anesthesia Care    Estimated Blood Loss (mL): n/a    Complications: None    Specimens:   ID Type Source Tests Collected by Time Destination   A :  Tissue Cecum SURGICAL PATHOLOGY Sharda Ocampo DO 3/3/2022 1501        Electronically signed by Sharda Ocampo DO, FACOS, FACS on 3/6/2022 at 11:06 AM

## 2022-03-03 NOTE — H&P
GENERAL SURGERY CONSULTATION      Patient's Name/ Date of Birth/ Gender: Isabella Deluca / [birth year] (78 y.o.) / male     PCP: Roxanna Lemus MD  Referring:     History of present Illness:  Patient is a pleasant 78 y.o. male  kindly referred by Roxanna Lemus MD     Direct referral screening colonoscopy. History prostate cancer. Last colonoscopy more than 10 years ago      Past Medical History:  has a past medical history of Arthritis, Cancer ([address] Hyperlipidemia, and Hypertension. Past Surgical History:   Past Surgical History:   Procedure Laterality Date    COLONOSCOPY  2008    JOINT REPLACEMENT Right     ankle    JOINT REPLACEMENT Left     TKA    LA OPEN RX DISTAL RADIUS FX, EXTRA-ARTICULAR Right 6/12/2018    WRIST OPEN REDUCTION INTERNAL FIXATION performed by El Call MD at [address] [place] [address] Bilateral     SHOULDER SURGERY Right     WRIST FRACTURE SURGERY Left 06/12/2018    Dr Author Yahaira CALLEJAS       Social History:  reports that he quit smoking about 40 years ago. He quit after 5.00 years of use. He has never used smokeless tobacco. He reports that he does not drink alcohol and does not use drugs. Family History: family history includes Arthritis in his father, maternal grandfather, maternal grandmother, mother, paternal grandfather, paternal grandmother, and sister; Cancer in his father; Heart Disease in his brother, father, mother, and sister; Heart Failure in his father and mother; High Blood Pressure in his brother, mother, and sister; High Cholesterol in his father and mother; Hypertension in his father and mother; Rheum Arthritis in his father; Stroke in his father and mother.     Review of Systems:   General: Completed and, except as mentioned above, was negative or noncontributory  Psychological:  Completed and, except as mentioned above, was negative or noncontributory  Ophthalmic:  Completed and, except as mentioned above, was negative or noncontributory  ENT:  Completed and, except as mentioned above, was negative or noncontributory  Allergy and Immunology:  Completed and, except as mentioned above, was negative or noncontributory  Hematological and Lymphatic:  Completed and, except as mentioned above, was negative or noncontributory  Endocrine: Completed and, except as mentioned above, was negative or noncontributory  Breast:  Completed and, except as mentioned above, was negative or noncontributory  Respiratory:  Completed and, except as mentioned above, was negative or noncontributory  Cardiovascular:  Completed and, except as mentioned above, was negative or noncontributory  Gastrointestinal: Completed and, except as mentioned above, was negative or noncontributory  Genito-Urinary:  Completed and, except as mentioned above, was negative or noncontributory  Musculoskeletal:  Completed and, except as mentioned above, was negative or noncontributory  Neurological:  Completed and, except as mentioned above, was negative or noncontributory  Dermatological:  Completed and, except as mentioned above, was negative or noncontributory    Allergies: Latex    Current Meds:  Current Facility-Administered Medications:     lactated ringers infusion, , IntraVENous, Continuous, Kimberly Ortega - CRNA, Last Rate: 100 mL/hr at 03/03/22 1322, New Bag at 03/03/22 1322    Physical Exam:  Vital signs and Nurse's note reviewed. BP (!) 160/89   Pulse 73   Temp 96.9 °F (36.1 °C) (Temporal)   Resp 16   Ht 5' 10\" (1.778 m)   Wt 189 lb (85.7 kg)   SpO2 99%   BMI 27.12 kg/m²    height is 5' 10\" (1.778 m) and weight is 189 lb (85.7 kg). His temporal temperature is 96.9 °F (36.1 °C). His blood pressure is 160/89 (abnormal) and his pulse is 73. His respiration is 16 and oxygen saturation is 99%. Gen:  A&Ox3, NAD. Pleasant and cooperative.   HEENT: PERRLA, EOMI, no scleral icterus  Neck:  no goiter  CVS: Regular rate and rhythm  Resp: Good bilateral air entry, no active wheezing, no labored breathing  Abd: soft, non-tender, non-distended, bowel sounds present rectal exam to be done at scope  Ext: Moves all extremities, no gross focal motor deficits  Skin: No erythema or ulcerations     Labs:   Lab Results   Component Value Date    WBC 7.3 06/11/2018    HGB 13.6 06/11/2018    HCT 39.7 06/11/2018    MCV 90.8 06/11/2018     06/11/2018     Lab Results   Component Value Date     06/11/2018    K 3.8 06/11/2018     06/11/2018    CO2 26 06/11/2018    BUN 11 06/11/2018    CREATININE 0.74 06/11/2018    GLUCOSE 117 06/11/2018    CALCIUM 9.1 06/11/2018     Lab Results   Component Value Date    ALKPHOS 85 06/11/2018    ALT 13 06/11/2018    AST 15 06/11/2018    PROT 6.9 06/11/2018    BILITOT 0.35 06/11/2018    LABALBU 3.7 06/11/2018     No results found for: AMYLASE  No results found for: LIPASE  No results found for: INR    Radiologic Studies:      Impressions/Recommendations:     Screening colonoscopy    Risks and benefits of colonoscopy have been discussed in detail with the patient. Risks of the procedure include bleeding, bowel perforation, possibly missing smaller lesions if the bowel prep is not adequate. Alternative studies include barium enema and virtual colonoscopy; however, if a lesion is seen, then one would still need to proceed with the gold standard colonoscopy to retrieve or biopsy the lesion. All the patient's questions are answered. Will proceed with colonoscopy under MAC. H&P  General Surgery        Pt Name: Talita Para  MRN: [id number]  YOB: 1945  Date of evaluation: 3/3/2022      [x] I have examined the patient and reviewed the H&P/Consult completed, and there are no changes to the patient or plans. [] I have examined the patient and reviewed the H&P/Consult and have noted the following changes: The patient was counseled at length about the risks of beverly Covid-19 during their perioperative period and any recovery window from their procedure.

## 2022-03-06 ENCOUNTER — TELEPHONE (OUTPATIENT)
Dept: SURGERY | Age: 77
End: 2022-03-06

## 2022-03-06 NOTE — TELEPHONE ENCOUNTER
3/6/22    [address] [place] [address]-  Please call this Dr office to get fax number, Dr. Maria L Julien needs my colonoscopy report, very important.  I have only this info:      Vadim Fuentes MD  Heme/Onc  [facility]  Dpt of Internal Medicine [address]  [address]  [place], [address] [facility]  Tel: [phone]    Will ask office to get fax number to send

## 2022-03-07 LAB — SURGICAL PATHOLOGY REPORT: NORMAL

## 2022-03-07 NOTE — RESULT ENCOUNTER NOTE
Please let patient know polyp removed from colonoscopy benign, needs one more colonoscopy in 3 years, sent you address to oncologist in [phone] to fax colonoscopy report too, thank you.

## 2022-03-08 ENCOUNTER — TELEPHONE (OUTPATIENT)
Dept: SURGERY | Age: 77
End: 2022-03-08

## 2022-03-08 NOTE — TELEPHONE ENCOUNTER
----- Message from Rony Wooten DO sent at 3/7/2022  2:16 PM EST -----  Please let patient know polyp removed from colonoscopy benign, needs one more colonoscopy in 3 years, sent you address to oncologist in [phone] to fax colonoscopy report too, thank you.

## 2022-04-02 PROBLEM — Z12.11 SCREEN FOR COLON CANCER: Status: RESOLVED | Noted: 2022-03-03 | Resolved: 2022-04-02

## 2024-02-06 PROBLEM — I77.819 AORTIC DILATATION (CMS-HCC): Status: ACTIVE | Noted: 2024-02-06

## 2024-02-06 PROBLEM — I51.7 LEFT VENTRICULAR HYPERTROPHY: Status: ACTIVE | Noted: 2024-02-06

## 2024-02-06 PROBLEM — E78.5 HYPERLIPIDEMIA: Status: ACTIVE | Noted: 2024-02-06

## 2024-02-06 PROBLEM — R55 VASOVAGAL NEAR-SYNCOPE: Status: ACTIVE | Noted: 2024-02-06

## 2024-02-06 PROBLEM — I10 ESSENTIAL HYPERTENSION, BENIGN: Status: ACTIVE | Noted: 2024-02-06

## 2024-02-06 PROBLEM — C61 PROSTATE CANCER (MULTI): Status: ACTIVE | Noted: 2024-02-06

## 2024-02-06 RX ORDER — CHOLECALCIFEROL (VITAMIN D3) 25 MCG
1 TABLET,CHEWABLE ORAL DAILY
COMMUNITY

## 2024-02-06 RX ORDER — ATORVASTATIN CALCIUM 10 MG/1
0.5 TABLET, FILM COATED ORAL DAILY
COMMUNITY

## 2024-02-06 RX ORDER — VIT C/E/ZN/COPPR/LUTEIN/ZEAXAN 250MG-90MG
3 CAPSULE ORAL DAILY
COMMUNITY

## 2024-02-06 RX ORDER — LEUPROLIDE ACETATE 45 MG
KIT INTRAMUSCULAR
COMMUNITY

## 2024-04-10 ENCOUNTER — OFFICE VISIT (OUTPATIENT)
Dept: CARDIOLOGY | Facility: CLINIC | Age: 79
End: 2024-04-10
Payer: COMMERCIAL

## 2024-04-10 VITALS
DIASTOLIC BLOOD PRESSURE: 80 MMHG | HEIGHT: 68 IN | SYSTOLIC BLOOD PRESSURE: 130 MMHG | BODY MASS INDEX: 29.55 KG/M2 | WEIGHT: 195 LBS | HEART RATE: 72 BPM

## 2024-04-10 DIAGNOSIS — R55 VASOVAGAL NEAR-SYNCOPE: ICD-10-CM

## 2024-04-10 DIAGNOSIS — I77.819 AORTIC DILATATION (CMS-HCC): ICD-10-CM

## 2024-04-10 DIAGNOSIS — I51.7 LEFT VENTRICULAR HYPERTROPHY: ICD-10-CM

## 2024-04-10 DIAGNOSIS — E78.2 MIXED HYPERLIPIDEMIA: ICD-10-CM

## 2024-04-10 DIAGNOSIS — I10 PRIMARY HYPERTENSION: ICD-10-CM

## 2024-04-10 DIAGNOSIS — Z87.891 FORMER SMOKER: ICD-10-CM

## 2024-04-10 PROCEDURE — 93000 ELECTROCARDIOGRAM COMPLETE: CPT | Performed by: INTERNAL MEDICINE

## 2024-04-10 PROCEDURE — 99214 OFFICE O/P EST MOD 30 MIN: CPT | Performed by: INTERNAL MEDICINE

## 2024-04-10 PROCEDURE — 1159F MED LIST DOCD IN RCRD: CPT | Performed by: INTERNAL MEDICINE

## 2024-04-10 PROCEDURE — 3075F SYST BP GE 130 - 139MM HG: CPT | Performed by: INTERNAL MEDICINE

## 2024-04-10 PROCEDURE — 3079F DIAST BP 80-89 MM HG: CPT | Performed by: INTERNAL MEDICINE

## 2024-04-10 PROCEDURE — 1036F TOBACCO NON-USER: CPT | Performed by: INTERNAL MEDICINE

## 2024-04-10 PROCEDURE — 1160F RVW MEDS BY RX/DR IN RCRD: CPT | Performed by: INTERNAL MEDICINE

## 2024-04-10 RX ORDER — HYDROCHLOROTHIAZIDE 12.5 MG/1
12.5 TABLET ORAL DAILY
COMMUNITY

## 2024-04-10 RX ORDER — LATANOPROST 50 UG/ML
1 SOLUTION/ DROPS OPHTHALMIC NIGHTLY
COMMUNITY

## 2024-04-10 RX ORDER — VALSARTAN 80 MG/1
80 TABLET ORAL DAILY
COMMUNITY

## 2024-04-10 RX ORDER — BRIMONIDINE TARTRATE 2 MG/ML
1 SOLUTION/ DROPS OPHTHALMIC 2 TIMES DAILY
COMMUNITY

## 2024-04-10 NOTE — PROGRESS NOTES
"Subjective   Fabio Teixeira is a 78 y.o. male       Chief Complaint    Annual Exam          78-year-old gentleman here for annual follow-up, this is the second time I am seeing him, he is doing well without any recurrent syncope or cardiovascular events.  He has a history of vasovagal syncope, left ventricular asymmetric hypertrophy with no gradient, and ascending aortic dilation previously measured at 4.2 cm.    [facility] HDL 75.  He is also treated for hypertension with good control on current therapies    He does have irregular heart rhythm therefore we will evaluate with ECG today, obtain another echocardiogram to assess his left ventricular hypertrophy and aortic outflow tract and ascending aortic root diameter otherwise continue current therapies and follow-up in 1 year         Review of Systems   All other systems reviewed and are negative.           Vitals:    04/10/24 1017   BP: 130/80   BP Location: Left arm   Patient Position: Sitting   Pulse: 72   Weight: 88.5 kg (195 lb)   Height: 1.727 m (5' 8\")   EKG done in office today      Objective   Physical Exam  Constitutional:       Appearance: Normal appearance.   HENT:      Nose: Nose normal.   Neck:      Vascular: No carotid bruit.   Cardiovascular:      Rate and Rhythm: Normal rate.      Pulses: Normal pulses.      Heart sounds: Normal heart sounds.   Pulmonary:      Effort: Pulmonary effort is normal.   Abdominal:      General: Bowel sounds are normal.      Palpations: Abdomen is soft.   Musculoskeletal:         General: Normal range of motion.      Cervical back: Normal range of motion.      Right lower leg: No edema.      Left lower leg: No edema.   Skin:     General: Skin is warm and dry.   Neurological:      General: No focal deficit present.      Mental Status: He is alert.   Psychiatric:         Mood and Affect: Mood normal.         Behavior: Behavior normal.         Thought Content: Thought content normal.         " Judgment: Judgment normal.         Allergies  Patient has no known allergies.     Current Medications    Current Outpatient Medications:     atorvastatin (Lipitor) 10 mg tablet, Take 0.5 tablets (5 mg) by mouth once daily., Disp: , Rfl:     brimonidine (AlphaGAN) 0.2 % ophthalmic solution, Administer 1 drop into both eyes 2 times a day., Disp: , Rfl:     cholecalciferol (Vitamin D-3) 25 MCG (1000 UT) capsule, Take 3 capsules (75 mcg) by mouth once daily., Disp: , Rfl:     cyanocobalamin, vitamin B-12, 1,000 mcg capsule, Take 1 capsule by mouth once daily., Disp: , Rfl:     hydroCHLOROthiazide (Microzide) 12.5 mg tablet, Take 1 tablet (12.5 mg) by mouth once daily., Disp: , Rfl:     latanoprost (Xalatan) 0.005 % ophthalmic solution, Administer 1 drop into both eyes once daily at bedtime., Disp: , Rfl:     leuprolide, 6-month, (Lupron Depot, 6 Month,) 45 mg injection, Inject into the muscle., Disp: , Rfl:     valsartan (Diovan) 80 mg tablet, Take 1 tablet (80 mg) by mouth once daily., Disp: , Rfl:                      Assessment/Plan   1. Vasovagal near-syncope        2. Left ventricular hypertrophy        3. Primary hypertension        4. Mixed hyperlipidemia        5. Aortic dilatation (CMS/HCC)        6. BMI 29.0-29.9,adult        7. Former smoker                 Scribe Attestation  By signing my name below, IBernice LPN, Scribshane   attest that this documentation has been prepared under the direction and in the presence of Arthur Watt DO.     Provider Attestation - Scribe documentation    All medical record entries made by the Scribe were at my direction and personally dictated by me. I have reviewed the chart and agree that the record accurately reflects my personal performance of the history, physical exam, discussion and plan.

## 2024-04-10 NOTE — PATIENT INSTRUCTIONS
Please bring all medicines, vitamins, and herbal supplements with you when you come to the office.    Prescriptions will not be filled unless you are compliant with your follow up appointments or have a follow up appointment scheduled as per instruction of your physician. Refills should be requested at the time of your visit.     BMI was above normal measurement. Current weight: 88.5 kg (195 lb)  Weight change since last visit (-) denotes wt loss 8 lbs   Weight loss needed to achieve BMI 25: 30.9 Lbs  Weight loss needed to achieve BMI 30: -1.9 Lbs  Provided instructions on dietary changes  Provided instructions on exercise.

## 2024-05-02 ENCOUNTER — HOSPITAL ENCOUNTER (OUTPATIENT)
Dept: CARDIOLOGY | Facility: CLINIC | Age: 79
Discharge: HOME | End: 2024-05-02
Payer: COMMERCIAL

## 2024-05-02 VITALS
HEIGHT: 68 IN | DIASTOLIC BLOOD PRESSURE: 78 MMHG | WEIGHT: 195 LBS | BODY MASS INDEX: 29.55 KG/M2 | SYSTOLIC BLOOD PRESSURE: 126 MMHG

## 2024-05-02 DIAGNOSIS — I51.7 LEFT VENTRICULAR HYPERTROPHY: ICD-10-CM

## 2024-05-02 DIAGNOSIS — I77.819 AORTIC DILATATION (CMS-HCC): ICD-10-CM

## 2024-05-02 DIAGNOSIS — I77.810 THORACIC AORTIC ECTASIA (CMS-HCC): ICD-10-CM

## 2024-05-02 DIAGNOSIS — R55 VASOVAGAL NEAR-SYNCOPE: ICD-10-CM

## 2024-05-02 PROCEDURE — 93306 TTE W/DOPPLER COMPLETE: CPT | Performed by: INTERNAL MEDICINE

## 2024-05-02 PROCEDURE — 93306 TTE W/DOPPLER COMPLETE: CPT

## 2024-05-03 LAB
AORTIC VALVE MEAN GRADIENT: 2 MMHG
AORTIC VALVE PEAK VELOCITY: 1.08 M/S
AV PEAK GRADIENT: 4.7 MMHG
AVA (PEAK VEL): 3.5 CM2
AVA (VTI): 2.99 CM2
EJECTION FRACTION APICAL 4 CHAMBER: 65.8
LEFT VENTRICLE INTERNAL DIMENSION DIASTOLE: 4.62 CM (ref 3.5–6)
LEFT VENTRICULAR OUTFLOW TRACT DIAMETER: 2.6 CM
MITRAL VALVE E/A RATIO: 0.53
MITRAL VALVE E/E' RATIO: 4.7
RIGHT VENTRICLE PEAK SYSTOLIC PRESSURE: 14.4 MMHG

## 2024-05-08 ENCOUNTER — TELEPHONE (OUTPATIENT)
Dept: CARDIOLOGY | Facility: CLINIC | Age: 79
End: 2024-05-08
Payer: COMMERCIAL

## 2024-05-08 NOTE — TELEPHONE ENCOUNTER
Result Communication    Resulted Orders   Transthoracic Echo Complete   Result Value Ref Range    AV mn grad 2.0 mmHg    AV pk acacia 1.08 m/s    LVOT diam 2.60 cm    MV E/A ratio 0.53     MV avg E/e' ratio 4.70     RVSP 14.4 mmHg    LVIDd 4.62 cm    Aortic Valve Area by Continuity of Peak Velocity 3.50 cm2    AV pk grad 4.7 mmHg    Aortic Valve Area by Continuity of VTI 2.99 cm2    LV A4C EF 65.8     Narrative                   [facility]  [address] [place] [address], [address], [place] [address]          Tel [phone] Fax [phone]    TRANSTHORACIC ECHOCARDIOGRAM REPORT       Patient Name:      EDWARD TONY         Reading Physician:    [id number] Ashlyn Mathews MD, [facility]  Study Date:        5/2/2024              Ordering Provider:    [id number] LISA GANDARA  MRN/PID:           [id number]              Fellow:  Accession#:        [id number]          Nurse:  Date of Birth/Age: 1945 / 78 years  Sonographer:          Cristal Espinosa RD, RVT  Gender:            M                     Additional Staff:  Height:            172.72 cm             Admit Date:  Weight:            88.45 kg              Admission Status:  BSA / BMI:         2.02 m2 / 29.65 kg/m2 Department Location:  [facility]  Blood Pressure: 126 /78 mmHg    Study Type:    TRANSTHORACIC ECHO (TTE) COMPLETE  Diagnosis/ICD: Syncope-R55; Ventricular hypertrophy or dilatation-I51.7;                 Ascending aorta dilatation-I77.810  Indication:    HTN, Hyperlipidemia, Former Smoker, Prostate Cancer  CPT Codes:     Echo Complete w Full Doppler-53695   Study Detail: The following Echo studies were performed: 2D, M-Mode, Doppler and                color flow.       PHYSICIAN  INTERPRETATION:  Left Ventricle: Left ventricular systolic function is normal, with an estimated ejection fraction of 60-65%. There are no regional wall motion abnormalities. The left ventricular cavity size is normal. Spectral Doppler shows a normal pattern of left ventricular diastolic filling.  Left Atrium: The left atrium is normal in size.  Right Ventricle: The right ventricle is normal in size. There is normal right ventricular global systolic function.  Right Atrium: The right atrium is normal in size.  Aortic Valve: The aortic valve is trileaflet. There is no evidence of aortic valve regurgitation. The peak instantaneous gradient of the aortic valve is 4.7 mmHg. The mean gradient of the aortic valve is 2.0 mmHg.  Mitral Valve: The mitral valve is mildly thickened. There is no evidence of mitral valve regurgitation.  Tricuspid Valve: The tricuspid valve is structurally normal. No evidence of tricuspid regurgitation.  Pulmonic Valve: The pulmonic valve is not well visualized. There is no indication of pulmonic valve regurgitation.  Pericardium: There is no pericardial effusion noted.  Aorta: The aortic root is normal. There is mild dilatation of the aortic root. Mildly dilated ascending aorta at 4.2 cm.  Systemic Veins: The inferior vena cava appears to be of normal size.       CONCLUSIONS:   1. Left ventricular systolic function is normal with a 60-65% estimated ejection fraction.   2. Mildly dilated ascending aorta at 4.2 cm.    QUANTITATIVE DATA SUMMARY:  2D MEASUREMENTS:                           Normal Ranges:  Ao Root d:     4.20 cm   (2.0-3.7cm)  LAs:           3.60 cm   (2.7-4.0cm)  RVIDd:         4.28 cm   (0.9-3.6cm)  IVSd:          1.33 cm   (0.6-1.1cm)  LVPWd:         0.97 cm   (0.6-1.1cm)  LVIDd:         4.62 cm   (3.9-5.9cm)  LVIDs:         3.21 cm  LV Mass Index: 96.0 g/m2  LV % FS        30.5 %    LV SYSTOLIC FUNCTION BY 2D PLANIMETRY (MOD):                      Normal Ranges:  EF-A4C View:  65.8 % (>=55%)    LV DIASTOLIC FUNCTION:                         Normal Ranges:  MV Peak E:    0.38 m/s (0.7-1.2 m/s)  MV Peak A:    0.72 m/s (0.42-0.7 m/s)  E/A Ratio:    0.53     (1.0-2.2)  MV lateral e' 0.08 m/s  MV medial e'  0.06 m/s  E/e' Ratio:   4.70     (<8.0)    MITRAL VALVE:                       Normal Ranges:  MV Vmax:    0.76 m/s (<=1.3m/s)  MV peak P.3 mmHg (<5mmHg)  MV mean P.0 mmHg (<48mmHg)    MITRAL INSUFFICIENCY:                       Normal Ranges:  MR Vmax: 323.00 cm/s  dP/dt:   757 mmHg/s  (>1200mmHg/sec)    AORTIC VALVE:                                    Normal Ranges:  AoV Vmax:                1.08 m/s (<=1.7m/s)  AoV Peak P.7 mmHg (<20mmHg)  AoV Mean P.0 mmHg (1.7-11.5mmHg)  LVOT Max Jersey:            0.71 m/s (<=1.1m/s)  AoV VTI:                 23.80 cm (18-25cm)  LVOT VTI:                13.40 cm  LVOT Diameter:           2.60 cm  (1.8-2.4cm)  AoV Area, VTI:           2.99 cm2 (2.5-5.5cm2)  AoV Area,Vmax:           3.50 cm2 (2.5-4.5cm2)  AoV Dimensionless Index: 0.56    TRICUSPID VALVE/RVSP:                              Normal Ranges:  Peak TR Velocity: 1.69 m/s  RV Syst Pressure: 14.4 mmHg (< 30mmHg)    PULMONIC VALVE:                       Normal Ranges:  PV Max Jersey: 0.5 m/s  (0.6-0.9m/s)  PV Max P.1 mmHg       [id number] Ashlyn Mathews MD, FACC  Electronically signed on 5/3/2024 at 5:20:39 PM         ** Final **         1:50 PM

## 2024-05-08 NOTE — TELEPHONE ENCOUNTER
----- Message from Arthur Watt DO sent at 5/8/2024  1:38 PM EDT -----  Aortic root remains stable at 4.2 cm and unchanged, continue following, no need for intervention.

## 2024-07-23 ENCOUNTER — APPOINTMENT (OUTPATIENT)
Dept: CT IMAGING | Age: 79
End: 2024-07-23
Payer: COMMERCIAL

## 2024-07-23 ENCOUNTER — APPOINTMENT (OUTPATIENT)
Dept: GENERAL RADIOLOGY | Age: 79
End: 2024-07-23
Payer: COMMERCIAL

## 2024-07-23 ENCOUNTER — HOSPITAL ENCOUNTER (INPATIENT)
Age: 79
LOS: 1 days | Discharge: HOME | End: 2024-07-24
Attending: INTERNAL MEDICINE | Admitting: INTERNAL MEDICINE
Payer: MEDICARE

## 2024-07-23 ENCOUNTER — APPOINTMENT (OUTPATIENT)
Age: 79
End: 2024-07-23
Attending: INTERNAL MEDICINE
Payer: COMMERCIAL

## 2024-07-23 ENCOUNTER — HOSPITAL ENCOUNTER (EMERGENCY)
Age: 79
Discharge: ANOTHER HOSPITAL | End: 2024-07-23
Attending: EMERGENCY MEDICINE
Payer: COMMERCIAL

## 2024-07-23 ENCOUNTER — APPOINTMENT (OUTPATIENT)
Dept: ULTRASOUND IMAGING | Age: 79
End: 2024-07-23
Payer: COMMERCIAL

## 2024-07-23 VITALS
TEMPERATURE: 98.5 F | RESPIRATION RATE: 16 BRPM | SYSTOLIC BLOOD PRESSURE: 126 MMHG | HEART RATE: 75 BPM | DIASTOLIC BLOOD PRESSURE: 66 MMHG | OXYGEN SATURATION: 100 %

## 2024-07-23 DIAGNOSIS — I25.10 CORONARY ARTERY DISEASE DUE TO CALCIFIED CORONARY LESION: ICD-10-CM

## 2024-07-23 DIAGNOSIS — I24.9 ACUTE CORONARY SYNDROME (HCC): Primary | ICD-10-CM

## 2024-07-23 DIAGNOSIS — I25.84 CORONARY ARTERY DISEASE DUE TO CALCIFIED CORONARY LESION: ICD-10-CM

## 2024-07-23 DIAGNOSIS — R10.9 ABDOMINAL PAIN, UNSPECIFIED ABDOMINAL LOCATION: ICD-10-CM

## 2024-07-23 DIAGNOSIS — E87.6 HYPOKALEMIA: ICD-10-CM

## 2024-07-23 DIAGNOSIS — R79.89 ELEVATED LFTS: ICD-10-CM

## 2024-07-23 DIAGNOSIS — R94.31 ACUTE ELECTROCARDIOGRAM CHANGES: ICD-10-CM

## 2024-07-23 PROBLEM — I20.0 UNSTABLE ANGINA (HCC): Status: ACTIVE | Noted: 2024-07-23

## 2024-07-23 LAB
ABO + RH BLD: NORMAL
ALBUMIN SERPL-MCNC: 4.2 G/DL (ref 3.5–5.2)
ALBUMIN/GLOB SERPL: 1.5 {RATIO} (ref 1–2.5)
ALP SERPL-CCNC: 77 U/L (ref 40–129)
ALT SERPL-CCNC: 64 U/L (ref 5–41)
ANION GAP SERPL CALCULATED.3IONS-SCNC: 10 MMOL/L (ref 9–17)
ARM BAND NUMBER: NORMAL
AST SERPL-CCNC: 148 U/L
BASOPHILS # BLD: 0 K/UL (ref 0–0.2)
BASOPHILS NFR BLD: 0 % (ref 0–2)
BILIRUB SERPL-MCNC: 1.9 MG/DL (ref 0.3–1.2)
BILIRUB UR QL STRIP: NEGATIVE
BLOOD BANK SAMPLE EXPIRATION: NORMAL
BLOOD GROUP ANTIBODIES SERPL: NEGATIVE
BUN SERPL-MCNC: 10 MG/DL (ref 8–23)
BUN/CREAT SERPL: 14 (ref 9–20)
CALCIUM SERPL-MCNC: 10 MG/DL (ref 8.6–10.4)
CHLORIDE SERPL-SCNC: 99 MMOL/L (ref 98–107)
CLARITY UR: CLEAR
CO2 SERPL-SCNC: 27 MMOL/L (ref 20–31)
COLOR UR: YELLOW
CREAT SERPL-MCNC: 0.7 MG/DL (ref 0.7–1.2)
ECHO AO ROOT DIAM: 2.3 CM
ECHO AO SINUS VALSALVA DIAM: 4.1 CM
ECHO AO ST JNCT DIAM: 2.6 CM
ECHO AV AREA PEAK VELOCITY: 4.4 CM2
ECHO AV AREA VTI: 4 CM2
ECHO AV CUSP MM: 2.1 CM
ECHO AV MEAN GRADIENT: 3 MMHG
ECHO AV MEAN VELOCITY: 0.9 M/S
ECHO AV PEAK GRADIENT: 6 MMHG
ECHO AV PEAK VELOCITY: 1.2 M/S
ECHO AV VELOCITY RATIO: 1
ECHO AV VTI: 24.4 CM
ECHO EST RA PRESSURE: 3 MMHG
ECHO LA AREA 2C: 20.4 CM2
ECHO LA AREA 4C: 17.9 CM2
ECHO LA MAJOR AXIS: 5.5 CM
ECHO LA MINOR AXIS: 5.1 CM
ECHO LA VOL BP: 53 ML (ref 18–58)
ECHO LA VOL MOD A2C: 65 ML (ref 18–58)
ECHO LA VOL MOD A4C: 40 ML (ref 18–58)
ECHO LV E' LATERAL VELOCITY: 11 CM/S
ECHO LV EDV A2C: 71 ML
ECHO LV EDV A4C: 117 ML
ECHO LV EJECTION FRACTION A2C: 81 %
ECHO LV EJECTION FRACTION A4C: 70 %
ECHO LV EJECTION FRACTION BIPLANE: 77 % (ref 55–100)
ECHO LV ESV A2C: 13 ML
ECHO LV ESV A4C: 36 ML
ECHO LV FRACTIONAL SHORTENING: 39 % (ref 28–44)
ECHO LV INTERNAL DIMENSION DIASTOLIC: 4.9 CM (ref 4.2–5.9)
ECHO LV INTERNAL DIMENSION SYSTOLIC: 3 CM
ECHO LV IVSD: 1.4 CM (ref 0.6–1)
ECHO LV MASS 2D: 253.7 G (ref 88–224)
ECHO LV POSTERIOR WALL DIASTOLIC: 1.2 CM (ref 0.6–1)
ECHO LV RELATIVE WALL THICKNESS RATIO: 0.49
ECHO LVOT AREA: 4.5 CM2
ECHO LVOT AV VTI INDEX: 0.88
ECHO LVOT DIAM: 2.4 CM
ECHO LVOT MEAN GRADIENT: 3 MMHG
ECHO LVOT PEAK GRADIENT: 6 MMHG
ECHO LVOT PEAK VELOCITY: 1.2 M/S
ECHO LVOT SV: 97.2 ML
ECHO LVOT VTI: 21.5 CM
ECHO MV A VELOCITY: 0.95 M/S
ECHO MV E DECELERATION TIME (DT): 251 MS
ECHO MV E VELOCITY: 0.56 M/S
ECHO MV E/A RATIO: 0.59
ECHO MV E/E' LATERAL: 5.09
ECHO PV MAX VELOCITY: 1.1 M/S
ECHO PV PEAK GRADIENT: 4 MMHG
ECHO RIGHT VENTRICULAR SYSTOLIC PRESSURE (RVSP): 38 MMHG
ECHO TV REGURGITANT MAX VELOCITY: 2.95 M/S
ECHO TV REGURGITANT PEAK GRADIENT: 35 MMHG
EOSINOPHIL # BLD: 0 K/UL (ref 0–0.44)
EOSINOPHILS RELATIVE PERCENT: 0 % (ref 1–4)
EPI CELLS #/AREA URNS HPF: NORMAL /HPF (ref 0–5)
ERYTHROCYTE [DISTWIDTH] IN BLOOD BY AUTOMATED COUNT: 13.2 % (ref 11.8–14.4)
GFR, ESTIMATED: >90 ML/MIN/1.73M2
GLUCOSE SERPL-MCNC: 116 MG/DL (ref 70–99)
GLUCOSE UR STRIP-MCNC: NEGATIVE MG/DL
HCT VFR BLD AUTO: 36.7 % (ref 40.7–50.3)
HGB BLD-MCNC: 13 G/DL (ref 13–17)
HGB UR QL STRIP.AUTO: NEGATIVE
IMM GRANULOCYTES # BLD AUTO: 0 K/UL (ref 0–0.3)
IMM GRANULOCYTES NFR BLD: 0 %
INR PPP: 1
KETONES UR STRIP-MCNC: NEGATIVE MG/DL
LACTATE BLDV-SCNC: 1.7 MMOL/L (ref 0.5–2.2)
LEUKOCYTE ESTERASE UR QL STRIP: NEGATIVE
LIPASE SERPL-CCNC: 19 U/L (ref 13–60)
LYMPHOCYTES NFR BLD: 0.26 K/UL (ref 1.1–3.7)
LYMPHOCYTES RELATIVE PERCENT: 2 % (ref 24–43)
MCH RBC QN AUTO: 32.4 PG (ref 25.2–33.5)
MCHC RBC AUTO-ENTMCNC: 35.4 G/DL (ref 28.4–34.8)
MCV RBC AUTO: 91.5 FL (ref 82.6–102.9)
MONOCYTES NFR BLD: 0.92 K/UL (ref 0.1–1.2)
MONOCYTES NFR BLD: 7 % (ref 3–12)
MORPHOLOGY: NORMAL
NEUTROPHILS NFR BLD: 91 % (ref 36–65)
NEUTS SEG NFR BLD: 12.02 K/UL (ref 1.5–8.1)
NITRITE UR QL STRIP: NEGATIVE
NRBC BLD-RTO: 0 PER 100 WBC
PH UR STRIP: 7.5 [PH] (ref 5–9)
PLATELET # BLD AUTO: 235 K/UL (ref 138–453)
PMV BLD AUTO: 10.2 FL (ref 8.1–13.5)
POTASSIUM SERPL-SCNC: 3.5 MMOL/L (ref 3.7–5.3)
PROT SERPL-MCNC: 7 G/DL (ref 6.4–8.3)
PROT UR STRIP-MCNC: NEGATIVE MG/DL
PROTHROMBIN TIME: 13.2 SEC (ref 11.7–14.1)
RBC # BLD AUTO: 4.01 M/UL (ref 4.21–5.77)
RBC #/AREA URNS HPF: NORMAL /HPF (ref 0–2)
SODIUM SERPL-SCNC: 136 MMOL/L (ref 135–144)
SP GR UR STRIP: 1.01 (ref 1.01–1.02)
TROPONIN I SERPL HS-MCNC: 11 NG/L (ref 0–22)
TROPONIN I SERPL HS-MCNC: 11 NG/L (ref 0–22)
UROBILINOGEN UR STRIP-ACNC: NORMAL EU/DL (ref 0–1)
WBC #/AREA URNS HPF: NORMAL /HPF (ref 0–5)
WBC OTHER # BLD: 13.2 K/UL (ref 3.5–11.3)

## 2024-07-23 PROCEDURE — 81001 URINALYSIS AUTO W/SCOPE: CPT

## 2024-07-23 PROCEDURE — 83690 ASSAY OF LIPASE: CPT

## 2024-07-23 PROCEDURE — 2500000003 HC RX 250 WO HCPCS: Performed by: PHYSICIAN ASSISTANT

## 2024-07-23 PROCEDURE — 85610 PROTHROMBIN TIME: CPT

## 2024-07-23 PROCEDURE — 93005 ELECTROCARDIOGRAM TRACING: CPT | Performed by: PHYSICIAN ASSISTANT

## 2024-07-23 PROCEDURE — 71275 CT ANGIOGRAPHY CHEST: CPT

## 2024-07-23 PROCEDURE — 86850 RBC ANTIBODY SCREEN: CPT

## 2024-07-23 PROCEDURE — 83605 ASSAY OF LACTIC ACID: CPT

## 2024-07-23 PROCEDURE — 36415 COLL VENOUS BLD VENIPUNCTURE: CPT

## 2024-07-23 PROCEDURE — 93306 TTE W/DOPPLER COMPLETE: CPT

## 2024-07-23 PROCEDURE — 93306 TTE W/DOPPLER COMPLETE: CPT | Performed by: INTERNAL MEDICINE

## 2024-07-23 PROCEDURE — 80053 COMPREHEN METABOLIC PANEL: CPT

## 2024-07-23 PROCEDURE — 96375 TX/PRO/DX INJ NEW DRUG ADDON: CPT

## 2024-07-23 PROCEDURE — 99222 1ST HOSP IP/OBS MODERATE 55: CPT

## 2024-07-23 PROCEDURE — 96374 THER/PROPH/DIAG INJ IV PUSH: CPT

## 2024-07-23 PROCEDURE — 86900 BLOOD TYPING SEROLOGIC ABO: CPT

## 2024-07-23 PROCEDURE — 93005 ELECTROCARDIOGRAM TRACING: CPT | Performed by: EMERGENCY MEDICINE

## 2024-07-23 PROCEDURE — 2060000000 HC ICU INTERMEDIATE R&B

## 2024-07-23 PROCEDURE — 84484 ASSAY OF TROPONIN QUANT: CPT

## 2024-07-23 PROCEDURE — 99285 EMERGENCY DEPT VISIT HI MDM: CPT

## 2024-07-23 PROCEDURE — 85025 COMPLETE CBC W/AUTO DIFF WBC: CPT

## 2024-07-23 PROCEDURE — 6370000000 HC RX 637 (ALT 250 FOR IP): Performed by: PHYSICIAN ASSISTANT

## 2024-07-23 PROCEDURE — 6360000002 HC RX W HCPCS: Performed by: PHYSICIAN ASSISTANT

## 2024-07-23 PROCEDURE — 2580000003 HC RX 258: Performed by: PHYSICIAN ASSISTANT

## 2024-07-23 PROCEDURE — 71045 X-RAY EXAM CHEST 1 VIEW: CPT

## 2024-07-23 PROCEDURE — 86901 BLOOD TYPING SEROLOGIC RH(D): CPT

## 2024-07-23 PROCEDURE — 6360000004 HC RX CONTRAST MEDICATION: Performed by: PHYSICIAN ASSISTANT

## 2024-07-23 PROCEDURE — 76705 ECHO EXAM OF ABDOMEN: CPT

## 2024-07-23 RX ORDER — ONDANSETRON 2 MG/ML
4 INJECTION INTRAMUSCULAR; INTRAVENOUS ONCE
Status: COMPLETED | OUTPATIENT
Start: 2024-07-23 | End: 2024-07-23

## 2024-07-23 RX ORDER — SODIUM CHLORIDE 9 MG/ML
INJECTION, SOLUTION INTRAVENOUS PRN
Status: DISCONTINUED | OUTPATIENT
Start: 2024-07-23 | End: 2024-07-24 | Stop reason: HOSPADM

## 2024-07-23 RX ORDER — VALSARTAN AND HYDROCHLOROTHIAZIDE 80; 12.5 MG/1; MG/1
1 TABLET, FILM COATED ORAL DAILY
Status: DISCONTINUED | OUTPATIENT
Start: 2024-07-24 | End: 2024-07-24 | Stop reason: CLARIF

## 2024-07-23 RX ORDER — POTASSIUM CHLORIDE 7.45 MG/ML
10 INJECTION INTRAVENOUS PRN
Status: DISCONTINUED | OUTPATIENT
Start: 2024-07-23 | End: 2024-07-24 | Stop reason: HOSPADM

## 2024-07-23 RX ORDER — SODIUM CHLORIDE 0.9 % (FLUSH) 0.9 %
5-40 SYRINGE (ML) INJECTION EVERY 12 HOURS SCHEDULED
Status: DISCONTINUED | OUTPATIENT
Start: 2024-07-24 | End: 2024-07-24 | Stop reason: HOSPADM

## 2024-07-23 RX ORDER — SODIUM CHLORIDE 0.9 % (FLUSH) 0.9 %
10 SYRINGE (ML) INJECTION PRN
Status: DISCONTINUED | OUTPATIENT
Start: 2024-07-23 | End: 2024-07-24 | Stop reason: HOSPADM

## 2024-07-23 RX ORDER — PANTOPRAZOLE SODIUM 40 MG/1
40 TABLET, DELAYED RELEASE ORAL
Status: DISCONTINUED | OUTPATIENT
Start: 2024-07-24 | End: 2024-07-24 | Stop reason: HOSPADM

## 2024-07-23 RX ORDER — ONDANSETRON 4 MG/1
4 TABLET, ORALLY DISINTEGRATING ORAL EVERY 8 HOURS PRN
Status: DISCONTINUED | OUTPATIENT
Start: 2024-07-23 | End: 2024-07-24 | Stop reason: HOSPADM

## 2024-07-23 RX ORDER — ATORVASTATIN CALCIUM 40 MG/1
40 TABLET, FILM COATED ORAL DAILY
Status: DISCONTINUED | OUTPATIENT
Start: 2024-07-24 | End: 2024-07-24

## 2024-07-23 RX ORDER — ACETAMINOPHEN 325 MG/1
650 TABLET ORAL EVERY 6 HOURS PRN
Status: DISCONTINUED | OUTPATIENT
Start: 2024-07-23 | End: 2024-07-24 | Stop reason: HOSPADM

## 2024-07-23 RX ORDER — ASPIRIN 81 MG/1
81 TABLET ORAL DAILY
Status: DISCONTINUED | OUTPATIENT
Start: 2024-07-24 | End: 2024-07-24 | Stop reason: HOSPADM

## 2024-07-23 RX ORDER — POTASSIUM CHLORIDE 20 MEQ/1
40 TABLET, EXTENDED RELEASE ORAL PRN
Status: DISCONTINUED | OUTPATIENT
Start: 2024-07-23 | End: 2024-07-24 | Stop reason: HOSPADM

## 2024-07-23 RX ORDER — 0.9 % SODIUM CHLORIDE 0.9 %
1000 INTRAVENOUS SOLUTION INTRAVENOUS ONCE
Status: COMPLETED | OUTPATIENT
Start: 2024-07-23 | End: 2024-07-23

## 2024-07-23 RX ORDER — POTASSIUM CHLORIDE 7.45 MG/ML
10 INJECTION INTRAVENOUS PRN
Status: DISCONTINUED | OUTPATIENT
Start: 2024-07-23 | End: 2024-07-24 | Stop reason: SDUPTHER

## 2024-07-23 RX ORDER — CARVEDILOL 3.12 MG/1
3.12 TABLET ORAL 2 TIMES DAILY WITH MEALS
Status: DISCONTINUED | OUTPATIENT
Start: 2024-07-24 | End: 2024-07-24 | Stop reason: HOSPADM

## 2024-07-23 RX ORDER — SODIUM CHLORIDE 0.9 % (FLUSH) 0.9 %
3 SYRINGE (ML) INJECTION EVERY 8 HOURS
Status: DISCONTINUED | OUTPATIENT
Start: 2024-07-23 | End: 2024-07-23 | Stop reason: HOSPADM

## 2024-07-23 RX ORDER — ACETAMINOPHEN 650 MG/1
650 SUPPOSITORY RECTAL EVERY 6 HOURS PRN
Status: DISCONTINUED | OUTPATIENT
Start: 2024-07-23 | End: 2024-07-24 | Stop reason: HOSPADM

## 2024-07-23 RX ORDER — NITROGLYCERIN 0.4 MG/1
0.4 TABLET SUBLINGUAL EVERY 5 MIN PRN
Status: DISCONTINUED | OUTPATIENT
Start: 2024-07-23 | End: 2024-07-24 | Stop reason: HOSPADM

## 2024-07-23 RX ORDER — ENOXAPARIN SODIUM 100 MG/ML
40 INJECTION SUBCUTANEOUS DAILY
Status: DISCONTINUED | OUTPATIENT
Start: 2024-07-24 | End: 2024-07-24 | Stop reason: HOSPADM

## 2024-07-23 RX ORDER — MAGNESIUM SULFATE 1 G/100ML
1000 INJECTION INTRAVENOUS PRN
Status: DISCONTINUED | OUTPATIENT
Start: 2024-07-23 | End: 2024-07-24 | Stop reason: HOSPADM

## 2024-07-23 RX ORDER — HYDROMORPHONE HYDROCHLORIDE 1 MG/ML
0.5 INJECTION, SOLUTION INTRAMUSCULAR; INTRAVENOUS; SUBCUTANEOUS ONCE
Status: COMPLETED | OUTPATIENT
Start: 2024-07-23 | End: 2024-07-23

## 2024-07-23 RX ORDER — ONDANSETRON 2 MG/ML
4 INJECTION INTRAMUSCULAR; INTRAVENOUS EVERY 6 HOURS PRN
Status: DISCONTINUED | OUTPATIENT
Start: 2024-07-23 | End: 2024-07-24 | Stop reason: HOSPADM

## 2024-07-23 RX ADMIN — IOPAMIDOL 100 ML: 755 INJECTION, SOLUTION INTRAVENOUS at 14:38

## 2024-07-23 RX ADMIN — SODIUM CHLORIDE 1000 ML: 9 INJECTION, SOLUTION INTRAVENOUS at 13:34

## 2024-07-23 RX ADMIN — POTASSIUM BICARBONATE 40 MEQ: 782 TABLET, EFFERVESCENT ORAL at 16:41

## 2024-07-23 RX ADMIN — HYDROMORPHONE HYDROCHLORIDE 0.5 MG: 1 INJECTION, SOLUTION INTRAMUSCULAR; INTRAVENOUS; SUBCUTANEOUS at 13:41

## 2024-07-23 RX ADMIN — ONDANSETRON 4 MG: 2 INJECTION INTRAMUSCULAR; INTRAVENOUS at 13:36

## 2024-07-23 RX ADMIN — FAMOTIDINE 20 MG: 10 INJECTION, SOLUTION INTRAVENOUS at 13:36

## 2024-07-23 ASSESSMENT — ENCOUNTER SYMPTOMS
NAUSEA: 1
BACK PAIN: 0
DIARRHEA: 0
COUGH: 0
SHORTNESS OF BREATH: 0
ABDOMINAL PAIN: 1
VOMITING: 0

## 2024-07-23 ASSESSMENT — PAIN SCALES - GENERAL: PAINLEVEL_OUTOF10: 0

## 2024-07-23 NOTE — ED NOTES
[facility] called to inform if pt prefers [facility], he will have to be on a wait list.  Pt asked that they reach out to [facility].  They then called back stating [facility] would also have a wait and would possibly have a bed tonight.  Per TOBIN Moreira, any place that can take a heart patient would be fine, including out of network facilities.

## 2024-07-23 NOTE — ED NOTES
Patient called out asking for something to eat. Per Dr. Calderon patient must stay NPO at this time. Patient updated on NPO status. Patient family again asking what time procedures will take place and asking for a phone number to call. Family again educated he will have to be evaluated at the receiving facility prior to anything being scheduled

## 2024-07-23 NOTE — ED NOTES
[facility] called with provider from [facility] on line to speak with TOBIN Moreira.

## 2024-07-23 NOTE — CONSULTS
I, Alisa Conn am scribing for and in the presence of Amador Velasquez MD, F.A.C.C..    Patient: Lars Brown  : 1945  Date of Admission: 2024  Primary Care Physician: Rell Choudhary  Today's Date: 2024    REASON FOR CONSULTATION/CC: Chest discomfort     HPI: I had the pleasure of seeing Mr. Brown today who is a 78 y.o. male with a history of intermittent chest discomfort leading to this consultation.     CTA chest abdomen done today 2024- shows     EKG changes from previous    Echo done today 2024-    Mr. Brown came to ED today for chest pain. He states he is currently feeling fine now.     He denied any current chest pain, shortness of breath, abdominal pain, bleeding problems, problems with his medications or any other concerns at this time.     Past Medical History:   Diagnosis Date    Arthritis     Cancer (HCC)     [facility]    Hyperlipidemia     Hypertension        CURRENT ALLERGIES: Latex REVIEW OF SYSTEMS: 14 systems were reviewed. Pertinent positives and negatives as above, all else negative.     Past Surgical History:   Procedure Laterality Date    COLONOSCOPY      COLONOSCOPY  2022    Dr. Buckner - rectal polyp    COLONOSCOPY N/A 3/3/2022    COLONOSCOPY WITH BIOPSY  with spot ink performed by Ruba Buckner DO at [facility] OR    JOINT REPLACEMENT Right     ankle    JOINT REPLACEMENT Left     TKA    [facility] OPTX DSTL RADL X-ARTIC FX/EPIPHYSL SEP Right 2018    WRIST OPEN REDUCTION INTERNAL FIXATION performed by Dinh Guzman MD at [facility] OR    ROTATOR CUFF REPAIR Bilateral     SHOULDER SURGERY Right     WRIST FRACTURE SURGERY Left 2018    Dr Guzman- [facility]    Social History:  Social History     Tobacco Use    Smoking status: Former     Current packs/day: 0.00     Types: Cigarettes     Start date:      Quit date:      Years since quittin.5    Smokeless tobacco: Never   Vaping Use    Vaping Use: Never used   Substance Use Topics    Alcohol

## 2024-07-23 NOTE — ED NOTES
Ultrasound tech at bedside performing ordered scan. Patient asking what times different procedures will be at the accepting facility. Patient and family informed they will not know what times things will be happening until he is transported to the receiving facility and they evaluate him.

## 2024-07-23 NOTE — ED PROVIDER NOTES
CAPS    Take 3,000 Int'l Units by mouth 3 times daily      ALLERGIES     is allergic to latex.  FAMILY HISTORY     He indicated that the status of his mother is unknown. He indicated that the status of his father is unknown. He indicated that the status of his sister is unknown. He indicated that the status of his brother is unknown. He indicated that the status of his maternal grandmother is unknown. He indicated that the status of his maternal grandfather is unknown. He indicated that the status of his paternal grandmother is unknown. He indicated that the status of his paternal grandfather is unknown.     SOCIAL HISTORY       Social History     Tobacco Use    Smoking status: Former     Current packs/day: 0.00     Types: Cigarettes     Start date:      Quit date:      Years since quittin.5    Smokeless tobacco: Never   Vaping Use    Vaping Use: Never used   Substance Use Topics    Alcohol use: No    Drug use: No          Js Calderon MD  Attending Emergency Physician          Js Calderon MD  [phone]    
He does recommend getting CT chest to make sure there is no PE.   [WM]      ED Course User Index  [WM] Js Calderon MD       1700-discussed testing results with patient and family at bedside and transfer decision.  Patient remains pain-free.    FINAL IMPRESSION      1. Acute coronary syndrome (HCC)    2. Acute electrocardiogram changes    3. Coronary artery disease due to calcified coronary lesion    4. Hypokalemia    5. Elevated LFTs    6. Abdominal pain, unspecified abdominal location          DISPOSITION/PLAN     DISPOSITION Decision To Transfer 07/23/2024 04:36:37 PM      PATIENT REFERRED TO:  No follow-up provider specified.    DISCHARGE MEDICATIONS:  New Prescriptions    No medications on file       (Please note that portions of this note were completed with a voice recognition program.  Efforts were made to edit the dictations but occasionally words are mis-transcribed.)    Leora Moreira PA-C (electronically signed)  Attending Emergency Physician           Leora Moreira PA-C  07/23/24 [id number]

## 2024-07-24 VITALS
BODY MASS INDEX: 28.33 KG/M2 | TEMPERATURE: 96.9 F | HEART RATE: 71 BPM | RESPIRATION RATE: 16 BRPM | SYSTOLIC BLOOD PRESSURE: 131 MMHG | HEIGHT: 68 IN | DIASTOLIC BLOOD PRESSURE: 64 MMHG | OXYGEN SATURATION: 98 % | WEIGHT: 186.9 LBS

## 2024-07-24 PROBLEM — I10 PRIMARY HYPERTENSION: Status: ACTIVE | Noted: 2024-02-06

## 2024-07-24 PROBLEM — R94.31 ACUTE ELECTROCARDIOGRAM CHANGES: Status: ACTIVE | Noted: 2024-07-23

## 2024-07-24 PROBLEM — R94.31 NONSPECIFIC ST-T WAVE ELECTROCARDIOGRAPHIC CHANGES: Status: ACTIVE | Noted: 2024-07-24

## 2024-07-24 PROBLEM — I20.89 STABLE ANGINA (HCC): Status: ACTIVE | Noted: 2024-07-23

## 2024-07-24 PROBLEM — I20.0 UNSTABLE ANGINA (HCC): Status: ACTIVE | Noted: 2024-07-24

## 2024-07-24 LAB
ANION GAP SERPL CALCULATED.3IONS-SCNC: 12 MMOL/L (ref 9–17)
BUN SERPL-MCNC: 8 MG/DL (ref 8–23)
BUN/CREAT SERPL: 11 (ref 9–20)
CALCIUM SERPL-MCNC: 9.1 MG/DL (ref 8.6–10.4)
CHLORIDE SERPL-SCNC: 100 MMOL/L (ref 98–107)
CO2 SERPL-SCNC: 25 MMOL/L (ref 20–31)
CREAT SERPL-MCNC: 0.7 MG/DL (ref 0.7–1.2)
ECHO BSA: 2.02 M2
EKG ATRIAL RATE: 67 BPM
EKG ATRIAL RATE: 69 BPM
EKG ATRIAL RATE: 83 BPM
EKG P AXIS: 45 DEGREES
EKG P AXIS: 65 DEGREES
EKG P-R INTERVAL: 174 MS
EKG P-R INTERVAL: 184 MS
EKG Q-T INTERVAL: 440 MS
EKG Q-T INTERVAL: 458 MS
EKG Q-T INTERVAL: 472 MS
EKG QRS DURATION: 172 MS
EKG QRS DURATION: 178 MS
EKG QRS DURATION: 180 MS
EKG QTC CALCULATION (BAZETT): 498 MS
EKG QTC CALCULATION (BAZETT): 523 MS
EKG QTC CALCULATION (BAZETT): 538 MS
EKG R AXIS: 23 DEGREES
EKG R AXIS: 6 DEGREES
EKG R AXIS: 68 DEGREES
EKG T AXIS: 20 DEGREES
EKG T AXIS: 40 DEGREES
EKG T AXIS: 6 DEGREES
EKG VENTRICULAR RATE: 67 BPM
EKG VENTRICULAR RATE: 83 BPM
EKG VENTRICULAR RATE: 85 BPM
ERYTHROCYTE [DISTWIDTH] IN BLOOD BY AUTOMATED COUNT: 13.9 % (ref 11.8–14.4)
GFR, ESTIMATED: >90 ML/MIN/1.73M2
GLUCOSE SERPL-MCNC: 102 MG/DL (ref 70–99)
HCT VFR BLD AUTO: 36.9 % (ref 40.7–50.3)
HGB BLD-MCNC: 12.6 G/DL (ref 13–17)
MAGNESIUM SERPL-MCNC: 1.9 MG/DL (ref 1.6–2.6)
MCH RBC QN AUTO: 33.1 PG (ref 25.2–33.5)
MCHC RBC AUTO-ENTMCNC: 34.1 G/DL (ref 28.4–34.8)
MCV RBC AUTO: 96.9 FL (ref 82.6–102.9)
NRBC BLD-RTO: 0 PER 100 WBC
PLATELET # BLD AUTO: 192 K/UL (ref 138–453)
PMV BLD AUTO: 10.3 FL (ref 8.1–13.5)
POTASSIUM SERPL-SCNC: 4.3 MMOL/L (ref 3.7–5.3)
RBC # BLD AUTO: 3.81 M/UL (ref 4.21–5.77)
SODIUM SERPL-SCNC: 137 MMOL/L (ref 135–144)
TROPONIN I SERPL HS-MCNC: 12 NG/L (ref 0–22)
TROPONIN I SERPL HS-MCNC: 12 NG/L (ref 0–22)
WBC OTHER # BLD: 9.4 K/UL (ref 3.5–11.3)

## 2024-07-24 PROCEDURE — 6370000000 HC RX 637 (ALT 250 FOR IP): Performed by: NURSE PRACTITIONER

## 2024-07-24 PROCEDURE — 6360000002 HC RX W HCPCS: Performed by: INTERNAL MEDICINE

## 2024-07-24 PROCEDURE — 99239 HOSP IP/OBS DSCHRG MGMT >30: CPT | Performed by: NURSE PRACTITIONER

## 2024-07-24 PROCEDURE — 4A023N7 MEASUREMENT OF CARDIAC SAMPLING AND PRESSURE, LEFT HEART, PERCUTANEOUS APPROACH: ICD-10-PCS | Performed by: INTERNAL MEDICINE

## 2024-07-24 PROCEDURE — 85027 COMPLETE CBC AUTOMATED: CPT

## 2024-07-24 PROCEDURE — 84484 ASSAY OF TROPONIN QUANT: CPT

## 2024-07-24 PROCEDURE — 2580000003 HC RX 258: Performed by: NURSE PRACTITIONER

## 2024-07-24 PROCEDURE — B2111ZZ FLUOROSCOPY OF MULTIPLE CORONARY ARTERIES USING LOW OSMOLAR CONTRAST: ICD-10-PCS | Performed by: INTERNAL MEDICINE

## 2024-07-24 PROCEDURE — 2500000003 HC RX 250 WO HCPCS: Performed by: INTERNAL MEDICINE

## 2024-07-24 PROCEDURE — 99152 MOD SED SAME PHYS/QHP 5/>YRS: CPT | Performed by: INTERNAL MEDICINE

## 2024-07-24 PROCEDURE — 93458 L HRT ARTERY/VENTRICLE ANGIO: CPT | Performed by: INTERNAL MEDICINE

## 2024-07-24 PROCEDURE — 2709999900 HC NON-CHARGEABLE SUPPLY: Performed by: INTERNAL MEDICINE

## 2024-07-24 PROCEDURE — 83735 ASSAY OF MAGNESIUM: CPT

## 2024-07-24 PROCEDURE — 2580000003 HC RX 258: Performed by: INTERNAL MEDICINE

## 2024-07-24 PROCEDURE — 93452 LEFT HRT CATH W/VENTRCLGRPHY: CPT | Performed by: INTERNAL MEDICINE

## 2024-07-24 PROCEDURE — C1894 INTRO/SHEATH, NON-LASER: HCPCS | Performed by: INTERNAL MEDICINE

## 2024-07-24 PROCEDURE — 80061 LIPID PANEL: CPT

## 2024-07-24 PROCEDURE — 36415 COLL VENOUS BLD VENIPUNCTURE: CPT

## 2024-07-24 PROCEDURE — 80048 BASIC METABOLIC PNL TOTAL CA: CPT

## 2024-07-24 PROCEDURE — B2151ZZ FLUOROSCOPY OF LEFT HEART USING LOW OSMOLAR CONTRAST: ICD-10-PCS | Performed by: INTERNAL MEDICINE

## 2024-07-24 PROCEDURE — 6360000004 HC RX CONTRAST MEDICATION: Performed by: INTERNAL MEDICINE

## 2024-07-24 RX ORDER — HEPARIN SODIUM 1000 [USP'U]/ML
INJECTION, SOLUTION INTRAVENOUS; SUBCUTANEOUS PRN
Status: DISCONTINUED | OUTPATIENT
Start: 2024-07-24 | End: 2024-07-24 | Stop reason: HOSPADM

## 2024-07-24 RX ORDER — MIDAZOLAM HYDROCHLORIDE 1 MG/ML
INJECTION INTRAMUSCULAR; INTRAVENOUS PRN
Status: DISCONTINUED | OUTPATIENT
Start: 2024-07-24 | End: 2024-07-24 | Stop reason: HOSPADM

## 2024-07-24 RX ORDER — SODIUM CHLORIDE 0.9 % (FLUSH) 0.9 %
5-40 SYRINGE (ML) INJECTION PRN
Status: DISCONTINUED | OUTPATIENT
Start: 2024-07-24 | End: 2024-07-24 | Stop reason: HOSPADM

## 2024-07-24 RX ORDER — VALSARTAN 80 MG/1
80 TABLET ORAL DAILY
Status: DISCONTINUED | OUTPATIENT
Start: 2024-07-24 | End: 2024-07-24 | Stop reason: HOSPADM

## 2024-07-24 RX ORDER — HYDROCHLOROTHIAZIDE 12.5 MG/1
12.5 TABLET ORAL DAILY
Status: DISCONTINUED | OUTPATIENT
Start: 2024-07-24 | End: 2024-07-24 | Stop reason: HOSPADM

## 2024-07-24 RX ORDER — ATORVASTATIN CALCIUM 40 MG/1
40 TABLET, FILM COATED ORAL NIGHTLY
Status: DISCONTINUED | OUTPATIENT
Start: 2024-07-24 | End: 2024-07-24 | Stop reason: HOSPADM

## 2024-07-24 RX ORDER — SODIUM CHLORIDE 9 MG/ML
INJECTION, SOLUTION INTRAVENOUS PRN
Status: DISCONTINUED | OUTPATIENT
Start: 2024-07-24 | End: 2024-07-24 | Stop reason: HOSPADM

## 2024-07-24 RX ORDER — SODIUM CHLORIDE 0.9 % (FLUSH) 0.9 %
5-40 SYRINGE (ML) INJECTION EVERY 12 HOURS SCHEDULED
Status: DISCONTINUED | OUTPATIENT
Start: 2024-07-24 | End: 2024-07-24 | Stop reason: HOSPADM

## 2024-07-24 RX ORDER — VALSARTAN 80 MG/1
80 TABLET ORAL DAILY
COMMUNITY

## 2024-07-24 RX ORDER — CARVEDILOL 3.12 MG/1
3.12 TABLET ORAL 2 TIMES DAILY WITH MEALS
Qty: 60 TABLET | Refills: 1 | Status: SHIPPED | OUTPATIENT
Start: 2024-07-24

## 2024-07-24 RX ORDER — HYDROCHLOROTHIAZIDE 12.5 MG/1
12.5 CAPSULE, GELATIN COATED ORAL DAILY
COMMUNITY

## 2024-07-24 RX ORDER — KETOCONAZOLE 20 MG/ML
SHAMPOO TOPICAL DAILY PRN
COMMUNITY

## 2024-07-24 RX ORDER — SODIUM CHLORIDE 9 MG/ML
INJECTION, SOLUTION INTRAVENOUS CONTINUOUS
Status: DISCONTINUED | OUTPATIENT
Start: 2024-07-24 | End: 2024-07-24 | Stop reason: HOSPADM

## 2024-07-24 RX ADMIN — ACETAMINOPHEN 650 MG: 325 TABLET ORAL at 01:01

## 2024-07-24 RX ADMIN — ATORVASTATIN CALCIUM 40 MG: 40 TABLET, FILM COATED ORAL at 01:01

## 2024-07-24 RX ADMIN — SODIUM CHLORIDE, PRESERVATIVE FREE 10 ML: 5 INJECTION INTRAVENOUS at 08:34

## 2024-07-24 RX ADMIN — CARVEDILOL 3.12 MG: 3.12 TABLET, FILM COATED ORAL at 08:35

## 2024-07-24 RX ADMIN — CARVEDILOL 3.12 MG: 3.12 TABLET, FILM COATED ORAL at 20:29

## 2024-07-24 RX ADMIN — SODIUM CHLORIDE: 9 INJECTION, SOLUTION INTRAVENOUS at 18:42

## 2024-07-24 RX ADMIN — VALSARTAN 80 MG: 80 TABLET ORAL at 08:35

## 2024-07-24 RX ADMIN — ASPIRIN 81 MG: 81 TABLET, COATED ORAL at 08:36

## 2024-07-24 ASSESSMENT — PAIN - FUNCTIONAL ASSESSMENT: PAIN_FUNCTIONAL_ASSESSMENT: ACTIVITIES ARE NOT PREVENTED

## 2024-07-24 ASSESSMENT — PAIN DESCRIPTION - DESCRIPTORS: DESCRIPTORS: ACHING

## 2024-07-24 ASSESSMENT — PAIN DESCRIPTION - LOCATION: LOCATION: HEAD

## 2024-07-24 ASSESSMENT — PAIN DESCRIPTION - ORIENTATION: ORIENTATION: MID

## 2024-07-24 ASSESSMENT — PAIN SCALES - GENERAL: PAINLEVEL_OUTOF10: 2

## 2024-07-24 NOTE — DISCHARGE SUMMARY
[facility]  Office: [phone]  Shayne Rutledge DO, Coy Saleh DO, Leonides Hollis DO, Randolph Neves DO, Dion Ferreira MD, Carine Henderson MD, Shannan Velasquez MD, Amarilys Brennan MD,  Trent Purdy MD, Iftikhar Grier MD, Darvin Carlos MD,  Nel Nj DO, Leander Parry MD, Derick Umanzor MD, Joaquín Rutledge DO, Zoie Ramos MD,  Mook Madrigal DO, Christel Maria MD, Geetha Samaniego MD, Blanca Vega MD, Codie Buitrago MD,  Blayne Boateng MD, Morelia Ralph MD, Jona Barry MD, Rosi Askew MD, Josemanuel Yancey MD, Ilir Chavez MD, Kt Liang DO, Isac Delgado DO, Gokul Romero MD,  Chandrakant Hcek MD, Shirley Waterhouse, CNP,  Brenda Metcalf CNP, Mike Fallon, CNP,  Brittney Chan, AGATA, Noemy Yarbrough CNP, Maribel Mendoza, CNP, Morenita Galeana CNP, Ana Obrien CNP, Fe Byers, PA-C, Sharon Dial PA-C, Brittani Allen, CNP, Sharmin Art, CNP, Jennifer Melendez CNP, Cara Chavez CNP, Chela Agarwal CNP, Keila De Jesus, CNS, Ashley Kelley, CNP, Zenobia Hernandez CNP, Tracy Schwab, CNP         [facility]   IN-PATIENT SERVICE   [facility]    Discharge Summary     Patient ID: Lars Brown  :  1945   MRN: [id number]     ACCOUNT:  [id number]   Patient's PCP: Rell Choudhary MD  Admit Date: 2024   Discharge Date: 2024   Length of Stay: 1  Code Status:  Full Code  Admitting Physician: Randolph Neves DO  Discharge Physician: SWETA Kirby CNP     Active Discharge Diagnoses:     Hospital Problem Lists:  Principal Problem:    Stable angina (HCC)  Active Problems:    HTN (hypertension)    Unstable angina (HCC)    Nonspecific ST-T wave electrocardiographic changes    Acute electrocardiogram changes  Resolved Problems:    * No resolved hospital problems. *      Admission Condition:  fair     Discharged Condition: stable    Hospital Stay:     Hospital Course:  Lars Brown is a 78 y.o. Non- / non  male who

## 2024-07-24 NOTE — CARE COORDINATION
Case Management Assessment  Initial Evaluation    Date/Time of Evaluation: 7/24/2024 9:42 AM  Assessment Completed by: Rashida Gerard RN    If patient is discharged prior to next notation, then this note serves as note for discharge by case management.    Patient Name: Lars Brown                   YOB: 1945  Diagnosis: Unstable angina (HCC) [I20.0]                   Date / Time: 7/23/2024 11:01 PM    Patient Admission Status: Inpatient   Readmission Risk (Low < 19, Mod (19-27), High > 27): Readmission Risk Score: 7.5    Current PCP: Rell Choudhary MD  PCP verified by CM? Yes    Chart Reviewed: Yes      History Provided by: Patient, Spouse, Child/Family  Patient Orientation: Alert and Oriented    Patient Cognition: Alert    Hospitalization in the last 30 days (Readmission):  No    If yes, Readmission Assessment in CM Navigator will be completed.    Advance Directives:      Code Status: Full Code   Patient's Primary Decision Maker is: Named in Scanned ACP Document (has present)      Discharge Planning:    Patient lives with: Spouse/Significant Other Type of Home: House  Primary Care Giver: Self  Patient Support Systems include: Spouse/Significant Other, Children   [facility] resources[facility]  ([facility]), Other (Comment) ([facility])  Current community resources:    Current services prior to admission: Durable Medical Equipment            Current DME: Cane, Walker, Wheelchair, Shower Chair            Type of Home Care services:  None    ADLS  Prior functional level: Independent in ADLs/IADLs  Current functional level: Independent in ADLs/IADLs    PT AM-PAC:   /24  OT AM-PAC:   /24    Family can provide assistance at DC: Yes  Would you like Case Management to discuss the discharge plan with any other family members/significant others, and if so, who? Yes (spouse son present)  Plans to Return to Present Housing: Yes  Other Identified Issues/Barriers to RETURNING to current housing: NA   Potential  Assistance needed at discharge: N/A            Potential DME:    Patient expects to discharge to: House  Plan for transportation at discharge: Family    Financial    Payor: [facility] / Plan: [facility] / Product Type: *No Product type* /     Does insurance require precert for SNF: Yes    Potential assistance Purchasing Medications:    Meds-to-Beds request:        [id number] - [place], [place] - [address] [phone] - F [phone]  [address] [address]  Phone: [phone] Fax: [phone]      Notes:    Factors facilitating achievement of predicted outcomes: Family support, Motivated, Cooperative, and Pleasant    Barriers to discharge: Medical complications    Additional Case Management Notes:     Pt is a+o, pleasant lives w wife. Independent, drives has dme if needed but does not use. Transfer from [place]. [facility] called [facility] to inform of hospitalization. Declines dc needs. Family to transport home. Watch for cardiac needs. Recevies most OP care from [facility].     The Plan for Transition of Care is related to the following treatment goals of Unstable angina (HCC) [I20.0]    IF APPLICABLE: The Patient and/or patient representative Lars and his family were provided with a choice of provider and agrees with the discharge plan. Freedom of choice list with basic dialogue that supports the patient's individualized plan of care/goals and shares the quality data associated with the providers was provided to:     Patient Representative Name:       The Patient and/or Patient Representative Agree with the Discharge Plan?      Rashida Gerard RN  Case Management Department  Ph:  Fax:

## 2024-07-24 NOTE — CONSULTS
[facility]    Consult                        Today's Date: 7/24/2024  Patient Name: Lars Brown  Date of admission: 7/23/2024 11:01 PM  Patient's age: 78 y.o., 1945  Admission Dx: Unstable angina (HCC) [I20.0]    Reason for Consult:  Cardiac evaluation    Requesting Physician: Randolph Neves DO    CHIEF COMPLAINT:  CP     History Obtained From:  Pt and EHR     HISTORY OF PRESENT ILLNESS:      \"Lars Brown is a 78 y.o. Non- / non  male who presents with No chief complaint on file.   and is admitted to the hospital for the management of Unstable angina (HCC).     Lars Brown is a 78 y.o. male who presented to the emergency department in [place], [place] due to upper abdominal pain.  Patient states that at about 830 this morning he developed sudden severe sharp cramping in his upper abdomen, which is progressively worsening.  It does make him feel nauseous.  He has not had any vomiting or diarrhea.  He has had very little to eat or drink.  He does not relate any aggravating or alleviating factors.  He denies chest pain or shortness of breath.  He denies any radiation of the pain.  Per chart review he has a history of a aortic dissection, hyperlipidemia, hypertension, and prostate cancer.  Per chart review she follows with Dr. Jassi Saleh related to vasovagal near syncope, left ventricular asymmetric hypertrophy and surveillance of AAA ascending aortic aneurysm with a dilation of 4.2 cm's as of May.      In ED 7/23/24: CTA chest/abdomen/pelvis and ECHO negative for acute process (EF 60%). however, EKG with acute T-Wave inversion changes indicative of ACS. Cardiology consulted and recommended transfer to cath lab.      Upon arrival to [place] patient is completely asymptomatic other than mild nausea and intermittent epigastric discomfort.  Patient is updated with plan of care and agreeable.  Cardiology consulted.\"     Per EKG review he may also have a new right bundle  tissue artifact,  but without significant evidence of myocardial ischemia or infarction.     2.  Global left ventricular systolic function was normal with an EF of 56%  without regional wall motion abnormalities.     3.  No significant electrocardiographic evidence of myocardial ischemia  during EKG monitoring without significant associated arrhythmias.     Overall, these results are most consistent with low risk for significant  coronary artery disease.      Labs:     CBC:   Recent Labs     07/23/24  1328 07/24/24  0358   WBC 13.2* 9.4   HGB 13.0 12.6*   HCT 36.7* 36.9*    192     BMP:   Recent Labs     07/23/24  1328 07/24/24  0358    137   K 3.5* 4.3   CO2 27 25   BUN 10 8   CREATININE 0.7 0.7   LABGLOM >90 >90   GLUCOSE 116* 102*     BNP: No results for input(s): \"BNP\" in the last 72 hours.  PT/INR:   Recent Labs     07/23/24  1410   PROTIME 13.2   INR 1.0     APTT:No results for input(s): \"APTT\" in the last 72 hours.  CARDIAC ENZYMES:No results for input(s): \"CKTOTAL\", \"CKMB\", \"CKMBINDEX\", \"TROPONINI\" in the last 72 hours.  FASTING LIPID PANEL:No results found for: \"HDL\", \"LDLDIRECT\", \"TRIG\"  LIVER PROFILE:  Recent Labs     07/23/24  1328   *   ALT 64*       IMPRESSION:    Patient Active Problem List   Diagnosis    S/P TKR (total knee replacement)    Prostate cancer metastatic to multiple sites (HCC)    HTN (hypertension)    Vasodepressor syncope    Constipation    Stable angina (HCC)    Nonspecific ST-T wave electrocardiographic changes    Primary hypertension    Prostate cancer (HCC)       RECOMMENDATIONS:  Concern for unstable angina. Negative trops.    New onset RBBB.    Continue ASA, statin, coreg, Valsartan  Discussed with Dr. English.  Will proceed with cardiac cath today to rule out ischemia.    If cardiac cath shows NOCAD, ok to d/c home later today.          Discussed with patient and Nurse.  Sierra Quick, CNP  [facility]           [phone]     I performed a

## 2024-07-24 NOTE — PROGRESS NOTES
4 Eyes Skin Assessment     NAME:  Lars Brown  YOB: 1945  MEDICAL RECORD NUMBER:  [id number]    The patient is being assessed for  Admission    I agree that at least one RN has performed a thorough Head to Toe Skin Assessment on the patient. ALL assessment sites listed below have been assessed.      Areas assessed by both nurses:    Head, Face, Ears, Shoulders, Back, Chest, Arms, Elbows, Hands, Sacrum. Buttock, Coccyx, Ischium, and Legs. Feet and Heels        Does the Patient have a Wound? No noted wound(s)       Carmelo Prevention initiated by RN: Yes  Wound Care Orders initiated by RN: No    Pressure Injury (Stage 3,4, Unstageable, DTI, NWPT, and Complex wounds) if present, place Wound referral order by RN under : No    New Ostomies, if present place, Ostomy referral order under : No     Nurse 1 eSignature: Electronically signed by Sapphire Saxena RN on 7/24/24 at 1:44 AM EDT    **SHARE this note so that the co-signing nurse can place an eSignature**    Nurse 2 eSignature: Electronically signed by Morenita Cooley RN on 7/24/24 at 1:46 AM EDT

## 2024-07-24 NOTE — H&P
..  [facility]  Office: [phone]  Shayne Rutledge DO, Coy Saleh DO, Leonides Hollis DO, Randolph Neves DO, Dion Ferreira MD, Carine Henderson MD, Shannan Velasquez MD, Amarilys Brennan MD,  Trent Purdy MD, Iftikhar Grier MD, Darvin Carlos MD,  Nel Nj DO, Leander Parry MD, Derick Umanzor MD, Joaquín Rutledge DO, Zoie Ramos MD,  Mook Madrigal DO, Christel Maria MD, Geetha Samaniego MD, Blanca Vega MD, Codie Buitrago MD,  Blayne Boateng MD, Morelia Ralph MD, Jona Barry MD, Rosi Askew MD, Josemanuel Yancey MD, Ilir Chavez MD, Kt Liang DO, Isac Delgado DO, Gokul Romero MD,  Chandrakant Heck MD, Shirley Waterhouse, CNP,  Brenda Metcalf, CNP, Mike Fallon, CNP,  Brittney Chan, DNP, Noemy Yarbrough, CNP, Maribel Mendoza, CNP, Morenita Galeana, CNP, Ana Obrien, CNP, Fe Byers, PA-C, Sharon Dial PA-C, Brittani Allen, CNP, Sharmin Art, CNP, Jennifer Melendez, CNP, Cara Chavez, CNP, Chela Agarwal, CNP, Keila De Jesus, CNS, Ashley Kelley, CNP, Zenobia Hernandez, CNP, Tracy Schwab, CNP         [facility]   IN-PATIENT SERVICE   [facility]    HISTORY AND PHYSICAL EXAMINATION            Date:   7/24/2024  Patient name:  Lars Brown  Date of admission:  7/23/2024 11:01 PM  MRN:   [id number]  Account:  [id number]  YOB: 1945  PCP:    Rell Choudhary MD  Room:   2038/2038-01  Code Status:    Full Code    Chief Complaint:     No chief complaint on file.  Chest pain/abdominal pain     History Obtained From:     patient, electronic medical record    History of Present Illness:     Lars Brown is a 78 y.o. Non- / non  male who presents with No chief complaint on file.   and is admitted to the hospital for the management of Unstable angina (HCC).    Lars Brown is a 78 y.o. male who presented to the emergency department in [place], [place] due to upper abdominal pain.  Patient states that at about 830 this morning he  status because of co-morbidities listed above, severity of signs and symptoms as outlined, requirement for current medical therapies and most importantly because of direct risk to patient if care not provided in a hospital setting.  Expected length of stay > 48 hours.    Charito Vasquez, SWETA - CNP  7/23/2024  11:16 PM    Copy sent to Rell Best MD

## 2024-07-24 NOTE — BRIEF OP NOTE
Brief Postoperative Note      Patient: Lars Brown  YOB: 1945  MRN: [id number]    Date of Procedure: 7/24/2024    Preop- unstable angina    Post-Op Diagnosis: Same       Procedure(s):  Left heart cath / coronary angiography    Surgeon(s):  Miguel A English MD    Assistant:  * No surgical staff found *    Anesthesia: IV Sedation    Estimated Blood Loss (mL): Minimal    Complications: None    Specimens:   * No specimens in log *    Implants:  * No implants in log *      Findings      Patient transferred for cardiac catheterization by cardiologist at [facility]    Aneurysmal changes of RCA, left main, left circumflex and LAD.    Mild to moderate coronary artery disease.    Intramyocardial bridging of mid LAD.    Normal LV systolic function.     Recommendations    Post cardiac cath protocol is recommended.    Patient management should include: Aggressive risk factor modification, aggressive medical management and optimal medical management.         Electronically signed by Miguel A English MD on 7/24/2024 at 4:24 PM

## 2024-07-24 NOTE — PROGRESS NOTES
Transitions of Care Pharmacy Service   Medication Review    The patient's list of current home medications has been reviewed.     Source(s) of information: spoke to patient and sure scripts    Based on information provided by the above source(s), I have updated the patient's home med list as described below.       I changed or updated the following medications on the patient's home medication list:  Discontinued diclofenac (CATAFLAM) 50 MG tablet  acetaminophen (TYLENOL) 500 MG tablet  goserelin (ZOLADEX) 10.8 MG injection  ibuprofen (ADVIL;MOTRIN) 200 MG tablet  valsartan-hydrochlorothiazide (DIOVAN-HCT) 80-12.5 MG per tablet  omeprazole (PRILOSEC) 20 MG capsule     Added valsartan (DIOVAN) 80 MG tablet  ketoconazole (NIZORAL) 2 % shampoo  hydroCHLOROthiazide 12.5 MG capsule      Adjusted   atorvastatin (LIPITOR) 10 MG tablet  latanoprost (XALATAN) 0.005 % ophthalmic solution     Other Notes None            Please feel free to call me with any questions about this encounter. Thank you.    This note will be reviewed and co-signed by the [facility] Pharmacist. The pharmacist will review inpatient orders and contact the physician about any discrepancies.    Lindsay Jackson, pharmacy technician  Transitions [facility] Pharmacy Service  Phone:  [phone]  Fax: [phone]      Electronically signed by Lindsay Jackson on 7/24/2024 at 5:05 PM       Prior to Admission medications    Medication Sig   valsartan (DIOVAN) 80 MG tablet Take 1 tablet by mouth daily   hydroCHLOROthiazide 12.5 MG capsule  Take 1 tablet by mouth daily   ketoconazole (NIZORAL) 2 % shampoo Apply topically daily as needed for Itching   atorvastatin (LIPITOR) 10 MG tablet Take 0.5 tablets by mouth nightly   latanoprost (XALATAN) 0.005 % ophthalmic solution Place 1 drop into both eyes nightly   aspirin 81 MG tablet   Take 1 tablet by mouth daily   calcium carbonate 600 MG TABS tablet Take 1 tablet by mouth daily   vitamin D 1000 UNITS CAPS  Take 3 capsules by mouth daily   brimonidine (ALPHAGAN) 0.2 % ophthalmic solution Place 1 drop into both eyes 2 times daily

## 2024-07-24 NOTE — PROGRESS NOTES
[facility]  Office: [phone]  Shayne Rutledge DO, Coy Saleh DO, Leonides Hollis DO, Randolph Neves DO, Dion Ferreira MD, Carine [facility] MD, Shannan Velasquez MD, Amarilys Brennan MD,  Trent Purdy MD, Iftikhar Grier MD, Darvin Carlos MD,  Nel Nj DO, Leander Parry MD, Derick Umanzor MD, Joaquín Rutledge DO, Zoie Ramos MD,  Mook Madrigal DO, Christel Maria MD, Geetha Samaniego MD, Blanca Vega MD, Codie Buitrago MD,  Blayne Boateng MD, Morelia Ralph MD, Jona Barry MD, Rosi Askew MD, Josemanuel Yancey MD, Ilir Chavez MD, Kt Liang DO, Isac Delgado DO, Gokul Romero MD,  Chandrakant Heck MD, Shirley Waterhouse, CNP,  Brenda Metcalf CNP, Mike Fallon, CNP,  Brittney Chan, DNP, Noemy Yarbrough, CNP, Maribel Mendoza, CNP, Morenita Galeana, CNP, Ana Obrien, CNP, Fe Byers, PA-C, Sharon Dial PA-C, Brittani Allen, CNP, Sharmin Art, CNP, Jennifer Melendez, CNP, Cara Chavez, CNP, Chela Agarwal, CNP, Keila De Jesus, CNS, Ashley Kelley, CNP, Zenobia Hernandez CNP, Tracy Schwab, CNP         [facility]   IN-PATIENT SERVICE   [facility]    Progress Note    7/24/2024    11:56 AM    Name:   Lars Brown  MRN:     [id number]     Acct:      [id number]   Room:   2038/2038-01   Day:  1  Admit Date:  7/23/2024 11:01 PM    PCP:   Rell Choudhary MD  Code Status:  Full Code    Subjective:     C/C: epigastric pain    Interval History Status: improved.     Troponin trend 11-  Asymptomatic this morning     Brief History:     Per previous documentation    \"Lars Brown is a 78 y.o. Non- / non  male who presents with No chief complaint on file.   and is admitted to the hospital for the management of Unstable angina (HCC).     Lars Brown is a 78 y.o. male who presented to the emergency department in [place], [place] due to upper abdominal pain.  Patient states that at about 830 this morning he developed sudden severe sharp cramping

## 2024-07-24 NOTE — PROGRESS NOTES
Patient transported from [facility] to [facility], room 2038, via stretcher. Report given via phone (Pino Larson RN) prior to patient arrival. Patient placed in bed and put on cardiac monitor. Vital signs obtained and stable at this time. Patient has been oriented to room, educated on how to use call light, and to call for assistance prior to getting up. Patient updated on plan of care and verbalized understanding. Patient is currently resting in bed comfortably. No distress noted. Bed in lowest and locked position. 2 siderails up for safety. Call light within reach.

## 2024-07-24 NOTE — PROGRESS NOTES
End Of Shift Note  [facility]    Summary of shift: Patient had a restful night. No c/o of chest pain/RUQ pain. Ambulated to bathroom, tolerated well. RA, VSS throughout shift. Plan for cards consult tomorrow (7/24) and possible cath.     Vitals:    Vitals:    07/23/24 2315 07/24/24 0000 07/24/24 0155 07/24/24 0400   BP:  99/68 (!) 100/51 116/61   Pulse:  73  73   Resp:  18  18   Temp:  98.2 °F (36.8 °C)  97.3 °F (36.3 °C)   TempSrc:  Temporal  Temporal   SpO2:  98%  93%   Weight: 84.7 kg (186 lb 11.2 oz)      Height: 1.727 m (5' 8\")           I&O:   Intake/Output Summary (Last 24 hours) at 7/24/2024 0611  Last data filed at 7/24/2024 0400  Gross per 24 hour   Intake 600 ml   Output 350 ml   Net 250 ml       Resp Status: RA    Ventilator Settings:     / / /     Critical Care IV infusions:   sodium chloride          LDA:   Peripheral IV 07/23/24 Right Antecubital (Active)   Number of days: 0       Peripheral IV 07/23/24 Left;Posterior Hand (Active)   Number of days: 0

## 2024-07-24 NOTE — CARE COORDINATION
DC Planning      [facility] they were notified and aware pt transferred to [facility]  for tx [facility]  ref # [id number].

## 2024-07-24 NOTE — PLAN OF CARE
Problem: Discharge Planning  Goal: Discharge to home or other facility with appropriate resources  Outcome: Progressing  Flowsheets (Taken 7/23/2024 3305)  Discharge to home or other facility with appropriate resources:   Identify barriers to discharge with patient and caregiver   Arrange for needed discharge resources and transportation as appropriate   Identify discharge learning needs (meds, wound care, etc)   Arrange for interpreters to assist at discharge as needed   Refer to discharge planning if patient needs post-hospital services based on physician order or complex needs related to functional status, cognitive ability or social support system     Problem: Safety - Adult  Goal: Free from fall injury  Outcome: Progressing     Problem: Pain  Goal: Verbalizes/displays adequate comfort level or baseline comfort level  Outcome: Progressing

## 2024-07-24 NOTE — PROGRESS NOTES
Dr. English discusses cath with patient and family, states patient can be discharged from cardiology standpoint

## 2024-07-25 ENCOUNTER — TELEPHONE (OUTPATIENT)
Dept: CARDIOLOGY | Facility: CLINIC | Age: 79
End: 2024-07-25
Payer: COMMERCIAL

## 2024-07-25 LAB
CHOLEST SERPL-MCNC: 162 MG/DL (ref 0–199)
CHOLESTEROL/HDL RATIO: 2
HDLC SERPL-MCNC: 87 MG/DL
LDLC SERPL CALC-MCNC: 65 MG/DL (ref 0–100)
TRIGL SERPL-MCNC: 51 MG/DL
VLDLC SERPL CALC-MCNC: 10 MG/DL

## 2024-07-25 NOTE — PROGRESS NOTES
Patient d/c'd ambulatory with wife and son (refused wheelchair) at this time. No complications with R radial site- no bleeding, no pain, and no numbness or tingling. Transparent dressing in place. Patient and family given all discharge instructions and all questions answered. Patient discharged with all belongings.

## 2024-07-25 NOTE — TELEPHONE ENCOUNTER
Patient states he is s/p heart cath, yesterday. Coreg was recommended , but wanted to wait until you reviewed to get your opinion. Patient states he went to [facility] and was transferred to [facility]. See cath under care every where.    April 2025 visit pending. To Dr. Arthur Watt, DO   Sooner visit?

## 2024-08-09 ENCOUNTER — APPOINTMENT (OUTPATIENT)
Dept: CARDIOLOGY | Facility: CLINIC | Age: 79
End: 2024-08-09
Payer: COMMERCIAL

## 2024-08-09 VITALS
HEIGHT: 69 IN | SYSTOLIC BLOOD PRESSURE: 100 MMHG | HEART RATE: 75 BPM | BODY MASS INDEX: 27.58 KG/M2 | DIASTOLIC BLOOD PRESSURE: 64 MMHG | WEIGHT: 186.2 LBS

## 2024-08-09 DIAGNOSIS — I71.21 ASCENDING AORTIC ANEURYSM, UNSPECIFIED WHETHER RUPTURED (CMS-HCC): ICD-10-CM

## 2024-08-09 DIAGNOSIS — I25.41 ANEURYSM OF CORONARY VESSELS: ICD-10-CM

## 2024-08-09 DIAGNOSIS — I77.819 AORTIC DILATATION (CMS-HCC): ICD-10-CM

## 2024-08-09 DIAGNOSIS — Z98.61 HISTORY OF PTCA: ICD-10-CM

## 2024-08-09 DIAGNOSIS — Z87.891 FORMER SMOKER: ICD-10-CM

## 2024-08-09 DIAGNOSIS — I21.4 NSTEMI (NON-ST ELEVATED MYOCARDIAL INFARCTION) (MULTI): ICD-10-CM

## 2024-08-09 DIAGNOSIS — E78.2 MIXED HYPERLIPIDEMIA: ICD-10-CM

## 2024-08-09 DIAGNOSIS — I10 PRIMARY HYPERTENSION: ICD-10-CM

## 2024-08-09 DIAGNOSIS — I25.10 CORONARY ARTERY DISEASE INVOLVING NATIVE CORONARY ARTERY OF NATIVE HEART, UNSPECIFIED WHETHER ANGINA PRESENT: ICD-10-CM

## 2024-08-09 PROCEDURE — 1159F MED LIST DOCD IN RCRD: CPT | Performed by: INTERNAL MEDICINE

## 2024-08-09 PROCEDURE — 3074F SYST BP LT 130 MM HG: CPT | Performed by: INTERNAL MEDICINE

## 2024-08-09 PROCEDURE — 3078F DIAST BP <80 MM HG: CPT | Performed by: INTERNAL MEDICINE

## 2024-08-09 PROCEDURE — 1160F RVW MEDS BY RX/DR IN RCRD: CPT | Performed by: INTERNAL MEDICINE

## 2024-08-09 PROCEDURE — 99214 OFFICE O/P EST MOD 30 MIN: CPT | Performed by: INTERNAL MEDICINE

## 2024-08-09 PROCEDURE — 1036F TOBACCO NON-USER: CPT | Performed by: INTERNAL MEDICINE

## 2024-08-09 RX ORDER — CARVEDILOL 3.12 MG/1
3.12 TABLET ORAL 2 TIMES DAILY
COMMUNITY
Start: 2024-07-24 | End: 2024-08-09 | Stop reason: SDUPTHER

## 2024-08-09 RX ORDER — CARVEDILOL 3.12 MG/1
3.12 TABLET ORAL 2 TIMES DAILY
Qty: 180 TABLET | Refills: 3 | Status: SHIPPED | OUTPATIENT
Start: 2024-08-09 | End: 2025-08-09

## 2024-08-09 RX ORDER — CALCIUM CARBONATE/VITAMIN D3 250-3.125
1 TABLET ORAL DAILY
COMMUNITY

## 2024-08-09 NOTE — PROGRESS NOTES
"Subjective   Fabio Teixeira is a 78 y.o. male       Chief Complaint    Follow-up          78-year-old gentleman here for follow-up following recent non-ST elevation MI, treated at [facility], details of cardiac catheterization, discharge summary are reviewed.    Patient showed up at [facility] emergency room with epigastric/upper abdominal focal discomfort that was apparently reproducible on palpation; apparently he had mildly elevated troponin levels and therefore was sent to [facility] he underwent cardiac catheterization which revealed mild aneurysmal coronary artery disease without obstructive findings, preserved LV function and treated conservatively.  He was discharged on low-dose carvedilol and maintained on valsartan, low-dose atorvastatin and aspirin.    Again the above-mentioned imaging and reports and discharge summaries reviewed extensively    Patient's most recent LDL is 65    We simply have followed him for mild ascending aorta dilation at 4.2 cm and hypertension which has been very well-controlled.    Recommendations, continue current therapies, continue routine surveillance, follow-up in 6 months         Review of Systems   All other systems reviewed and are negative.           Vitals:    08/09/24 1334   BP: 100/64   BP Location: Left arm   Patient Position: Sitting   Pulse: 75   Weight: 84.5 kg (186 lb 3.2 oz)   Height: 1.753 m (5' 9\")        Objective   Physical Exam  Constitutional:       Appearance: Normal appearance.   HENT:      Nose: Nose normal.   Neck:      Vascular: No carotid bruit.   Cardiovascular:      Rate and Rhythm: Normal rate.      Pulses: Normal pulses.      Heart sounds: Normal heart sounds.   Pulmonary:      Effort: Pulmonary effort is normal.   Abdominal:      General: Bowel sounds are normal.      Palpations: Abdomen is soft.   Musculoskeletal:         General: Normal range of motion.      Cervical back: Normal range of motion.      Right lower leg: No edema.      Left lower " leg: No edema.   Skin:     General: Skin is warm and dry.   Neurological:      General: No focal deficit present.      Mental Status: He is alert.   Psychiatric:         Mood and Affect: Mood normal.         Behavior: Behavior normal.         Thought Content: Thought content normal.         Judgment: Judgment normal.         Allergies  Patient has no known allergies.     Current Medications    Current Outpatient Medications:     atorvastatin (Lipitor) 10 mg tablet, Take 0.5 tablets (5 mg) by mouth once daily., Disp: , Rfl:     brimonidine (AlphaGAN) 0.2 % ophthalmic solution, Administer 1 drop into both eyes 2 times a day., Disp: , Rfl:     calcium carbonate-vitamin D3 (Oyster Shell) 250 mg-3.125 mcg (125 unit) tablet, Take 1 tablet by mouth once daily., Disp: , Rfl:     carvedilol (Coreg) 3.125 mg tablet, Take 1 tablet (3.125 mg) by mouth 2 times a day., Disp: , Rfl:     cholecalciferol (Vitamin D-3) 25 MCG (1000 UT) capsule, Take 3 capsules (75 mcg) by mouth once daily., Disp: , Rfl:     hydroCHLOROthiazide (Microzide) 12.5 mg tablet, Take 1 tablet (12.5 mg) by mouth once daily., Disp: , Rfl:     latanoprost (Xalatan) 0.005 % ophthalmic solution, Administer 1 drop into both eyes once daily at bedtime., Disp: , Rfl:     leuprolide, 6-month, (Lupron Depot, 6 Month,) 45 mg injection, Inject into the muscle., Disp: , Rfl:     valsartan (Diovan) 80 mg tablet, Take 1 tablet (80 mg) by mouth once daily., Disp: , Rfl:                      Assessment/Plan   1. Coronary artery disease involving native coronary artery of native heart, unspecified whether angina present        2. History of PTCA        3. NSTEMI (non-ST elevated myocardial infarction) (Multi)        4. Aneurysm of coronary vessels        5. Aortic dilatation (CMS-HCC)        6. Ascending aortic aneurysm, unspecified whether ruptured (CMS-HCC)        7. Mixed hyperlipidemia        8. Primary hypertension        9. BMI 27.0-27.9,adult        10. Former smoker                  Scribe Attestation  By signing my name below, I, Rob Mcintyre LPNibshane   attest that this documentation has been prepared under the direction and in the presence of Arthur Watt DO.     Provider Attestation - Scribe documentation    All medical record entries made by the Scribe were at my direction and personally dictated by me. I have reviewed the chart and agree that the record accurately reflects my personal performance of the history, physical exam, discussion and plan.

## 2024-08-09 NOTE — LETTER
"August 9, 2024     No Recipients    Patient: Fabio Teixeira   YOB: 1945   Date of Visit: 8/9/2024       Dear Dr. Aiken Recipients:    Thank you for referring Fabio Teixeira to me for evaluation. Below are my notes for this consultation.  If you have questions, please do not hesitate to call me. I look forward to following your patient along with you.       Sincerely,     Arthur Watt, DO      CC: No Recipients  ______________________________________________________________________________________    Subjective   Fabio Teixeira is a 78 y.o. male       Chief Complaint    Follow-up          78-year-old gentleman here for follow-up following recent non-ST elevation MI, treated at [facility], details of cardiac catheterization, discharge summary are reviewed.    Patient showed up at [facility] emergency room with epigastric/upper abdominal focal discomfort that was apparently reproducible on palpation; apparently he had mildly elevated troponin levels and therefore was sent to [facility] he underwent cardiac catheterization which revealed mild aneurysmal coronary artery disease without obstructive findings, preserved LV function and treated conservatively.  He was discharged on low-dose carvedilol and maintained on valsartan, low-dose atorvastatin and aspirin.    Again the above-mentioned imaging and reports and discharge summaries reviewed extensively    Patient's most recent LDL is 65    We simply have followed him for mild ascending aorta dilation at 4.2 cm and hypertension which has been very well-controlled.    Recommendations, continue current therapies, continue routine surveillance, follow-up in 6 months         Review of Systems   All other systems reviewed and are negative.           Vitals:    08/09/24 1334   BP: 100/64   BP Location: Left arm   Patient Position: Sitting   Pulse: 75   Weight: 84.5 kg (186 lb 3.2 oz)   Height: 1.753 m (5' 9\")        Objective   Physical Exam  Constitutional:  "      Appearance: Normal appearance.   HENT:      Nose: Nose normal.   Neck:      Vascular: No carotid bruit.   Cardiovascular:      Rate and Rhythm: Normal rate.      Pulses: Normal pulses.      Heart sounds: Normal heart sounds.   Pulmonary:      Effort: Pulmonary effort is normal.   Abdominal:      General: Bowel sounds are normal.      Palpations: Abdomen is soft.   Musculoskeletal:         General: Normal range of motion.      Cervical back: Normal range of motion.      Right lower leg: No edema.      Left lower leg: No edema.   Skin:     General: Skin is warm and dry.   Neurological:      General: No focal deficit present.      Mental Status: He is alert.   Psychiatric:         Mood and Affect: Mood normal.         Behavior: Behavior normal.         Thought Content: Thought content normal.         Judgment: Judgment normal.         Allergies  Patient has no known allergies.     Current Medications    Current Outpatient Medications:   •  atorvastatin (Lipitor) 10 mg tablet, Take 0.5 tablets (5 mg) by mouth once daily., Disp: , Rfl:   •  brimonidine (AlphaGAN) 0.2 % ophthalmic solution, Administer 1 drop into both eyes 2 times a day., Disp: , Rfl:   •  calcium carbonate-vitamin D3 (Oyster Shell) 250 mg-3.125 mcg (125 unit) tablet, Take 1 tablet by mouth once daily., Disp: , Rfl:   •  carvedilol (Coreg) 3.125 mg tablet, Take 1 tablet (3.125 mg) by mouth 2 times a day., Disp: , Rfl:   •  cholecalciferol (Vitamin D-3) 25 MCG (1000 UT) capsule, Take 3 capsules (75 mcg) by mouth once daily., Disp: , Rfl:   •  hydroCHLOROthiazide (Microzide) 12.5 mg tablet, Take 1 tablet (12.5 mg) by mouth once daily., Disp: , Rfl:   •  latanoprost (Xalatan) 0.005 % ophthalmic solution, Administer 1 drop into both eyes once daily at bedtime., Disp: , Rfl:   •  leuprolide, 6-month, (Lupron Depot, 6 Month,) 45 mg injection, Inject into the muscle., Disp: , Rfl:   •  valsartan (Diovan) 80 mg tablet, Take 1 tablet (80 mg) by mouth once  daily., Disp: , Rfl:                      Assessment/Plan   1. Coronary artery disease involving native coronary artery of native heart, unspecified whether angina present        2. History of PTCA        3. NSTEMI (non-ST elevated myocardial infarction) (Multi)        4. Aneurysm of coronary vessels        5. Aortic dilatation (CMS-HCC)        6. Ascending aortic aneurysm, unspecified whether ruptured (CMS-HCC)        7. Mixed hyperlipidemia        8. Primary hypertension        9. BMI 27.0-27.9,adult        10. Former smoker                 Scribe Attestation  By signing my name below, I, Bernice CASTORENA LPN  , Scribe   attest that this documentation has been prepared under the direction and in the presence of Arthur Watt DO.     Provider Attestation - Scribe documentation    All medical record entries made by the Scribe were at my direction and personally dictated by me. I have reviewed the chart and agree that the record accurately reflects my personal performance of the history, physical exam, discussion and plan.

## 2024-11-18 ENCOUNTER — TELEPHONE (OUTPATIENT)
Dept: CARDIOLOGY | Facility: CLINIC | Age: 79
End: 2024-11-18
Payer: COMMERCIAL

## 2024-11-18 NOTE — TELEPHONE ENCOUNTER
Patient left vm via the nurse line states oncologist had switched him from lupron to Orgovyx for his prostate cancer, but there was an contraindication between that and the coreg, so the Oncologist switched the coreg to metoprolol 25mg daily, but patient reports his blood pressures and heart rates are low and he is having dizziness/lightheaded episodes. Some of the lower readings are 78/52 86/60 96/70 and HR 50-60's     Patient inquiring if he can wean himself off the BB due to these readings and not tolerating well.     TO Dr. Arthur Watt, DO

## 2025-02-12 ENCOUNTER — APPOINTMENT (OUTPATIENT)
Dept: CARDIOLOGY | Facility: CLINIC | Age: 80
End: 2025-02-12
Payer: COMMERCIAL

## 2025-02-12 VITALS
HEART RATE: 64 BPM | HEIGHT: 68 IN | WEIGHT: 189.6 LBS | DIASTOLIC BLOOD PRESSURE: 82 MMHG | BODY MASS INDEX: 28.73 KG/M2 | SYSTOLIC BLOOD PRESSURE: 130 MMHG

## 2025-02-12 DIAGNOSIS — I21.4 NSTEMI (NON-ST ELEVATED MYOCARDIAL INFARCTION) (MULTI): ICD-10-CM

## 2025-02-12 DIAGNOSIS — I71.21 ASCENDING AORTIC ANEURYSM, UNSPECIFIED WHETHER RUPTURED (CMS-HCC): ICD-10-CM

## 2025-02-12 DIAGNOSIS — I25.10 CORONARY ARTERY DISEASE INVOLVING NATIVE CORONARY ARTERY OF NATIVE HEART, UNSPECIFIED WHETHER ANGINA PRESENT: ICD-10-CM

## 2025-02-12 DIAGNOSIS — I25.10 ASHD (ARTERIOSCLEROTIC HEART DISEASE): ICD-10-CM

## 2025-02-12 DIAGNOSIS — Z87.891 FORMER SMOKER: ICD-10-CM

## 2025-02-12 PROCEDURE — 1160F RVW MEDS BY RX/DR IN RCRD: CPT | Performed by: INTERNAL MEDICINE

## 2025-02-12 PROCEDURE — 1159F MED LIST DOCD IN RCRD: CPT | Performed by: INTERNAL MEDICINE

## 2025-02-12 PROCEDURE — 3075F SYST BP GE 130 - 139MM HG: CPT | Performed by: INTERNAL MEDICINE

## 2025-02-12 PROCEDURE — 1036F TOBACCO NON-USER: CPT | Performed by: INTERNAL MEDICINE

## 2025-02-12 PROCEDURE — 3079F DIAST BP 80-89 MM HG: CPT | Performed by: INTERNAL MEDICINE

## 2025-02-12 PROCEDURE — 99213 OFFICE O/P EST LOW 20 MIN: CPT | Performed by: INTERNAL MEDICINE

## 2025-02-12 RX ORDER — ASPIRIN 81 MG/1
81 TABLET ORAL DAILY
COMMUNITY

## 2025-02-12 RX ORDER — VALSARTAN 40 MG/1
40 TABLET ORAL DAILY
Qty: 90 TABLET | Refills: 3 | Status: SHIPPED | OUTPATIENT
Start: 2025-02-12 | End: 2026-02-12

## 2025-02-12 NOTE — PATIENT INSTRUCTIONS
Please bring all medicines, vitamins, and herbal supplements with you when you come to the office.    Prescriptions will not be filled unless you are compliant with your follow up appointments or have a follow up appointment scheduled as per instruction of your physician. Refills should be requested at the time of your visit.   BMI was above normal measurement. Current weight: 86 kg (189 lb 9.6 oz)  Weight change since last visit (-) denotes wt loss 3.4 lbs   Weight loss needed to achieve BMI 25: 25.5 Lbs  Weight loss needed to achieve BMI 30: -7.3 Lbs  Provided instructions on dietary changes  Provided instructions on exercise.

## 2025-02-12 NOTE — LETTER
February 12, 2025     Pk Villavicencio MD  [address]  [place]  [facility] [address]    Patient: Fabio Teixeira   YOB: 1945   Date of Visit: 2/12/2025       Dear Dr. Pk Villavicencio MD:    Thank you for referring Fabio Teixeira to me for evaluation. Below are my notes for this consultation.  If you have questions, please do not hesitate to call me. I look forward to following your patient along with you.       Sincerely,     Arthur Watt, DO      CC: No Recipients  ______________________________________________________________________________________    Subjective   Fabio Teixeira is a 79 y.o. male       Chief Complaint    Follow-up          79-year-old gentleman returns for 6-month follow-up, he is doing very well he denies any cardiovascular events or complaints or nitrate usage or hospitalizations.  Discontinued carvedilol on his own due to side effects and hypotension.    This past year he was diagnosed with very small non-ST elevation MI (troponin elevation-non-MI) associated with a noncardiovascular illness, [facility] and underwent left heart catheterization revealing coronary aneurysmal disease but no stenotic disease and normal left ventricular function.  We continue to follow him for ascending aortic ectasia/aneurysmal disease interestingly, CT of the chest from July did not reveal any aneurysmal disease and there are no dimensions mentioned on the CT imaging.  [facility]    Strictly from a cardiac standpoint he is stable with heart failure or angina or repeat hospitalizations.  His LDL is 116 his HDL is 81.    Recommendations, continue current therapies and follow-up in 1 year         Review of Systems   All other systems reviewed and are negative.           Vitals:    02/12/25 1028   BP: 130/82   BP Location: Left arm   Patient Position: Sitting   Pulse: 64   Weight: 86 kg (189 lb 9.6 oz)  "  Height: 1.727 m (5' 8\")        Objective   Physical Exam  Constitutional:       Appearance: Normal appearance.   HENT:      Nose: Nose normal.   Neck:      Vascular: No carotid bruit.   Cardiovascular:      Rate and Rhythm: Normal rate.      Pulses: Normal pulses.      Heart sounds: Normal heart sounds.   Pulmonary:      Effort: Pulmonary effort is normal.   Abdominal:      General: Bowel sounds are normal.      Palpations: Abdomen is soft.   Musculoskeletal:         General: Normal range of motion.      Cervical back: Normal range of motion.      Right lower leg: No edema.      Left lower leg: No edema.   Skin:     General: Skin is warm and dry.   Neurological:      General: No focal deficit present.      Mental Status: He is alert.   Psychiatric:         Mood and Affect: Mood normal.         Behavior: Behavior normal.         Thought Content: Thought content normal.         Judgment: Judgment normal.         Allergies  Patient has no known allergies.     Current Medications    Current Outpatient Medications:   •  aspirin 81 mg EC tablet, Take 1 tablet (81 mg) by mouth once daily., Disp: , Rfl:   •  atorvastatin (Lipitor) 10 mg tablet, Take 0.5 tablets (5 mg) by mouth once daily., Disp: , Rfl:   •  brimonidine (AlphaGAN) 0.2 % ophthalmic solution, Administer 1 drop into both eyes 2 times a day., Disp: , Rfl:   •  calcium carbonate-vitamin D3 (Oyster Shell) 250 mg-3.125 mcg (125 unit) tablet, Take 1 tablet by mouth once daily., Disp: , Rfl:   •  hydroCHLOROthiazide (Microzide) 12.5 mg tablet, Take 1 tablet (12.5 mg) by mouth once daily., Disp: , Rfl:   •  latanoprost (Xalatan) 0.005 % ophthalmic solution, Administer 1 drop into both eyes once daily at bedtime., Disp: , Rfl:   •  relugolix (Orgovyx) 120 mg tablet, Take 1 tablet (120 mg total) by mouth early in the morning.., Disp: , Rfl:                      Assessment/Plan   1. Coronary artery disease involving native coronary artery of native heart, unspecified " whether angina present  Follow Up In Cardiology      2. ASHD (arteriosclerotic heart disease)        3. Ascending aortic aneurysm, unspecified whether ruptured (CMS-[facility])        4. NSTEMI (non-ST elevated myocardial infarction) (Multi)        5. BMI 28.0-28.9,adult        6. Former smoker                 Scribe Attestation  By signing my name below, ILissette LPN  , Scribe   attest that this documentation has been prepared under the direction and in the presence of Arthur Watt DO.     Provider Attestation - Scribe documentation    All medical record entries made by the Scribe were at my direction and personally dictated by me. I have reviewed the chart and agree that the record accurately reflects my personal performance of the history, physical exam, discussion and plan.

## 2025-02-12 NOTE — PROGRESS NOTES
"Subjective   Fabio Teixeira is a 79 y.o. male       Chief Complaint    Follow-up          79-year-old gentleman returns for 6-month follow-up, he is doing very well he denies any cardiovascular events or complaints or nitrate usage or hospitalizations.  Discontinued carvedilol on his own due to side effects and hypotension.    This past year he was diagnosed with very small non-ST elevation MI (troponin elevation-non-MI) associated with a noncardiovascular illness, was sent to [facility] and underwent left heart catheterization revealing coronary aneurysmal disease but no stenotic disease and normal left ventricular function.  We continue to follow him for ascending aortic ectasia/aneurysmal disease interestingly, CT of the chest from July did not reveal any aneurysmal disease and there are no dimensions mentioned on the CT imaging.  [facility] on a [facility] basis    Strictly from a cardiac standpoint he is stable with heart failure or angina or repeat hospitalizations.  His LDL is 116 his HDL is 81.    Recommendations, continue current therapies and follow-up in 1 year         Review of Systems   All other systems reviewed and are negative.           Vitals:    02/12/25 1028   BP: 130/82   BP Location: Left arm   Patient Position: Sitting   Pulse: 64   Weight: 86 kg (189 lb 9.6 oz)   Height: 1.727 m (5' 8\")        Objective   Physical Exam  Constitutional:       Appearance: Normal appearance.   HENT:      Nose: Nose normal.   Neck:      Vascular: No carotid bruit.   Cardiovascular:      Rate and Rhythm: Normal rate.      Pulses: Normal pulses.      Heart sounds: Normal heart sounds.   Pulmonary:      Effort: Pulmonary effort is normal.   Abdominal:      General: Bowel sounds are normal.      Palpations: Abdomen is soft.   Musculoskeletal:         General: Normal range of motion.      Cervical back: Normal range of motion.      Right lower leg: No " edema.      Left lower leg: No edema.   Skin:     General: Skin is warm and dry.   Neurological:      General: No focal deficit present.      Mental Status: He is alert.   Psychiatric:         Mood and Affect: Mood normal.         Behavior: Behavior normal.         Thought Content: Thought content normal.         Judgment: Judgment normal.         Allergies  Patient has no known allergies.     Current Medications    Current Outpatient Medications:     aspirin 81 mg EC tablet, Take 1 tablet (81 mg) by mouth once daily., Disp: , Rfl:     atorvastatin (Lipitor) 10 mg tablet, Take 0.5 tablets (5 mg) by mouth once daily., Disp: , Rfl:     brimonidine (AlphaGAN) 0.2 % ophthalmic solution, Administer 1 drop into both eyes 2 times a day., Disp: , Rfl:     calcium carbonate-vitamin D3 (Oyster Shell) 250 mg-3.125 mcg (125 unit) tablet, Take 1 tablet by mouth once daily., Disp: , Rfl:     hydroCHLOROthiazide (Microzide) 12.5 mg tablet, Take 1 tablet (12.5 mg) by mouth once daily., Disp: , Rfl:     latanoprost (Xalatan) 0.005 % ophthalmic solution, Administer 1 drop into both eyes once daily at bedtime., Disp: , Rfl:     relugolix (Orgovyx) 120 mg tablet, Take 1 tablet (120 mg total) by mouth early in the morning.., Disp: , Rfl:                      Assessment/Plan   1. Coronary artery disease involving native coronary artery of native heart, unspecified whether angina present  Follow Up In Cardiology      2. ASHD (arteriosclerotic heart disease)        3. Ascending aortic aneurysm, unspecified whether ruptured (CMS-[facility])        4. NSTEMI (non-ST elevated myocardial infarction) (Multi)        5. BMI 28.0-28.9,adult        6. Former smoker                 Scribe Attestation  By signing my name below, I, Lissette WHITTAKER LPN  , Scribe   attest that this documentation has been prepared under the direction and in the presence of Arthur Watt DO.     Provider Attestation - Scribe documentation    All medical record entries made by  the Scribe were at my direction and personally dictated by me. I have reviewed the chart and agree that the record accurately reflects my personal performance of the history, physical exam, discussion and plan.

## 2025-04-10 ENCOUNTER — APPOINTMENT (OUTPATIENT)
Dept: CARDIOLOGY | Facility: CLINIC | Age: 80
End: 2025-04-10
Payer: COMMERCIAL

## 2025-08-06 ENCOUNTER — TELEPHONE (OUTPATIENT)
Dept: SURGERY | Age: 80
End: 2025-08-06

## 2025-08-06 DIAGNOSIS — Z12.11 ENCOUNTER FOR SCREENING COLONOSCOPY: ICD-10-CM

## 2025-08-06 DIAGNOSIS — I10 PRIMARY HYPERTENSION: Primary | Chronic | ICD-10-CM

## 2025-08-06 PROBLEM — R55 VASOVAGAL NEAR-SYNCOPE: Status: ACTIVE | Noted: 2024-02-06

## 2025-08-06 PROBLEM — Z87.891 FORMER SMOKER: Status: ACTIVE | Noted: 2025-08-06

## 2025-08-06 PROBLEM — I51.7 LEFT VENTRICULAR HYPERTROPHY: Status: ACTIVE | Noted: 2024-02-06

## 2025-08-06 PROBLEM — R29.818 TRANSIENT NEUROLOGICAL SYMPTOMS: Status: ACTIVE | Noted: 2025-08-06

## 2025-08-06 PROBLEM — E78.5 HYPERLIPIDEMIA: Status: ACTIVE | Noted: 2024-02-06

## 2025-08-06 PROBLEM — I77.819 AORTIC DILATATION: Status: ACTIVE | Noted: 2024-02-06

## 2025-08-06 PROBLEM — E66.3 OVERWEIGHT: Status: ACTIVE | Noted: 2025-08-06

## 2025-08-06 PROBLEM — H40.9 GLAUCOMA: Status: ACTIVE | Noted: 2025-08-06

## 2025-08-07 ENCOUNTER — HOSPITAL ENCOUNTER (OUTPATIENT)
Dept: NON INVASIVE DIAGNOSTICS | Age: 80
Discharge: HOME | End: 2025-08-07
Payer: MEDICARE

## 2025-08-07 DIAGNOSIS — I10 PRIMARY HYPERTENSION: Chronic | ICD-10-CM

## 2025-08-07 LAB
EKG ATRIAL RATE: 61 BPM
EKG P AXIS: 40 DEGREES
EKG P-R INTERVAL: 180 MS
EKG Q-T INTERVAL: 468 MS
EKG QRS DURATION: 168 MS
EKG QTC CALCULATION (BAZETT): 471 MS
EKG R AXIS: 8 DEGREES
EKG T AXIS: 11 DEGREES
EKG VENTRICULAR RATE: 61 BPM

## 2025-08-07 PROCEDURE — 93005 ELECTROCARDIOGRAM TRACING: CPT

## 2025-08-07 PROCEDURE — 93010 ELECTROCARDIOGRAM REPORT: CPT | Performed by: FAMILY MEDICINE

## 2026-02-18 ENCOUNTER — APPOINTMENT (OUTPATIENT)
Dept: CARDIOLOGY | Facility: CLINIC | Age: 81
End: 2026-02-18
Payer: MEDICARE

## (undated) DEVICE — CANNULA ORAL NSL AD CO2 N INTUB O2 DEL DISP TRU LNK

## (undated) DEVICE — Z DISCONTINUED BY MEDLINE USE 2151814 SPLINT ORTH W4INXL15FT FBRGLS RL FRM LO PROF PD 1 SIDE LTWT

## (undated) DEVICE — YANKAUER,BULB TIP,W/O VENT,RIGID,STERILE: Brand: MEDLINE

## (undated) DEVICE — SUTURE VCRL SZ 3-0 L27IN ABSRB UD L26MM SH 1/2 CIR J416H

## (undated) DEVICE — SOLUTION IV IRRIG POUR BRL 0.9% SODIUM CHL 2F7124

## (undated) DEVICE — PADDING CAST W2INXL4YD COT LO LINTING WYTEX

## (undated) DEVICE — FORCEPS BX JUMBO L240CM DIA2.8MM WRK CHN 3.2MM ORNG W/ NDL

## (undated) DEVICE — SYRINGE, LUER LOCK, 10ML: Brand: MEDLINE

## (undated) DEVICE — CATHETER DIAG AD 6FR L100CM 0.038IN STD COR POLYUR JUDKINS

## (undated) DEVICE — YANKAUER,FLEXIBLE HANDLE,REGLR CAPACITY: Brand: MEDLINE INDUSTRIES, INC.

## (undated) DEVICE — BIT DRILL 2.2MM

## (undated) DEVICE — Device: Brand: SPOT EX ENDOSCOPIC TATTOO

## (undated) DEVICE — SUTURE MCRYL SZ 4-0 L18IN ABSRB UD L16MM PC-3 3/8 CIR PRIM Y845G

## (undated) DEVICE — NEEDLE 25GAX5.0MM INJ CARR LOCKE

## (undated) DEVICE — GLOVE SURG SZ 75 L12IN FNGR THK87MIL WHT LTX FREE

## (undated) DEVICE — HAND AND FT PK

## (undated) DEVICE — SPONGE GZ W4XL4IN COT 12 PLY TYP VII WVN C FLD DSGN

## (undated) DEVICE — HYPODERMIC SAFETY NEEDLE: Brand: MAGELLAN

## (undated) DEVICE — GLIDESHEATH SLENDER STAINLESS STEEL KIT: Brand: GLIDESHEATH SLENDER

## (undated) DEVICE — CATHETER DIAG SM AD 6FR L100CM 0.038IN COR POLYUR JUDKINS L

## (undated) DEVICE — GAUZE,SPONGE,FLUFF,6"X6.75",STRL,5/TRAY: Brand: MEDLINE

## (undated) DEVICE — DISCONTINUED NO SUB IDED TG GLOVE SURG SENSICARE ALOE LT LF PF ST GRN SZ 8

## (undated) DEVICE — TUBING SUCT 12FR MAL ALUM SHFT FN CAP VENT UNIV CONN W/ OBT

## (undated) DEVICE — CATHETER ANGIO JL4 0.038 IN AD 6 FRX100 CM STD SUPER TORQUE

## (undated) DEVICE — DRAPE C ARM CLP FOR GE 9600 9800

## (undated) DEVICE — PADDING,UNDERCAST,COTTON, 4"X4YD STERILE: Brand: MEDLINE

## (undated) DEVICE — TUBING, SUCTION, 9/32" X 12', STRAIGHT: Brand: MEDLINE INDUSTRIES, INC.

## (undated) DEVICE — MEDI-VAC NON-CONDUCTIVE TUBING7MM X 30.5 (100FT): Brand: CARDINAL HEALTH

## (undated) DEVICE — SUTURE ETHLN SZ 3-0 L18IN NONABSORBABLE BLK L24MM PS-1 3/8 1663G

## (undated) DEVICE — BAND COMPR L24CM REG CLR PLAS HEMSTAT EXT HK AND LOOP RETEN